# Patient Record
Sex: MALE | Race: WHITE | NOT HISPANIC OR LATINO | Employment: OTHER | URBAN - METROPOLITAN AREA
[De-identification: names, ages, dates, MRNs, and addresses within clinical notes are randomized per-mention and may not be internally consistent; named-entity substitution may affect disease eponyms.]

---

## 2019-01-15 ENCOUNTER — PREP FOR SURGERY (OUTPATIENT)
Dept: OTHER | Facility: HOSPITAL | Age: 54
End: 2019-01-15

## 2019-01-15 DIAGNOSIS — Z12.11 SCREEN FOR COLON CANCER: Primary | ICD-10-CM

## 2019-01-23 PROBLEM — Z12.11 SCREEN FOR COLON CANCER: Status: ACTIVE | Noted: 2019-01-23

## 2019-02-28 ENCOUNTER — HOSPITAL ENCOUNTER (OUTPATIENT)
Facility: HOSPITAL | Age: 54
Setting detail: HOSPITAL OUTPATIENT SURGERY
Discharge: HOME | End: 2019-02-28
Attending: SURGERY | Admitting: SURGERY

## 2019-02-28 ENCOUNTER — ANESTHESIA (OUTPATIENT)
Dept: GASTROENTEROLOGY | Facility: HOSPITAL | Age: 54
End: 2019-02-28

## 2019-02-28 ENCOUNTER — ANESTHESIA EVENT (OUTPATIENT)
Dept: GASTROENTEROLOGY | Facility: HOSPITAL | Age: 54
End: 2019-02-28

## 2019-02-28 VITALS
TEMPERATURE: 97.7 F | BODY MASS INDEX: 28.33 KG/M2 | WEIGHT: 197.9 LBS | HEART RATE: 77 BPM | DIASTOLIC BLOOD PRESSURE: 95 MMHG | SYSTOLIC BLOOD PRESSURE: 139 MMHG | OXYGEN SATURATION: 96 % | HEIGHT: 70 IN | RESPIRATION RATE: 16 BRPM

## 2019-02-28 DIAGNOSIS — Z12.11 SCREEN FOR COLON CANCER: ICD-10-CM

## 2019-02-28 PROCEDURE — 88305 TISSUE EXAM BY PATHOLOGIST: CPT | Performed by: SURGERY

## 2019-02-28 PROCEDURE — 45380 COLONOSCOPY AND BIOPSY: CPT | Performed by: SURGERY

## 2019-02-28 PROCEDURE — 25010000002 PROPOFOL 10 MG/ML EMULSION: Performed by: ANESTHESIOLOGY

## 2019-02-28 PROCEDURE — 45385 COLONOSCOPY W/LESION REMOVAL: CPT | Performed by: SURGERY

## 2019-02-28 RX ORDER — SODIUM CHLORIDE, SODIUM LACTATE, POTASSIUM CHLORIDE, CALCIUM CHLORIDE 600; 310; 30; 20 MG/100ML; MG/100ML; MG/100ML; MG/100ML
1000 INJECTION, SOLUTION INTRAVENOUS CONTINUOUS
Status: DISCONTINUED | OUTPATIENT
Start: 2019-02-28 | End: 2019-02-28 | Stop reason: HOSPADM

## 2019-02-28 RX ORDER — LIDOCAINE HYDROCHLORIDE 20 MG/ML
INJECTION, SOLUTION INFILTRATION; PERINEURAL AS NEEDED
Status: DISCONTINUED | OUTPATIENT
Start: 2019-02-28 | End: 2019-02-28 | Stop reason: SURG

## 2019-02-28 RX ORDER — PROPOFOL 10 MG/ML
VIAL (ML) INTRAVENOUS AS NEEDED
Status: DISCONTINUED | OUTPATIENT
Start: 2019-02-28 | End: 2019-02-28 | Stop reason: SURG

## 2019-02-28 RX ADMIN — LIDOCAINE HYDROCHLORIDE 100 MG: 20 INJECTION, SOLUTION INFILTRATION; PERINEURAL at 12:25

## 2019-02-28 RX ADMIN — SODIUM CHLORIDE, POTASSIUM CHLORIDE, SODIUM LACTATE AND CALCIUM CHLORIDE 1000 ML: 600; 310; 30; 20 INJECTION, SOLUTION INTRAVENOUS at 11:33

## 2019-02-28 RX ADMIN — SODIUM CHLORIDE, POTASSIUM CHLORIDE, SODIUM LACTATE AND CALCIUM CHLORIDE: 600; 310; 30; 20 INJECTION, SOLUTION INTRAVENOUS at 12:25

## 2019-02-28 RX ADMIN — PROPOFOL 400 MG: 10 INJECTION, EMULSION INTRAVENOUS at 12:25

## 2019-02-28 NOTE — ANESTHESIA PREPROCEDURE EVALUATION
Anesthesia Evaluation     Patient summary reviewed and Nursing notes reviewed   NPO Solid Status: > 8 hours  NPO Liquid Status: > 4 hours           Airway   Mallampati: II  TM distance: >3 FB  Neck ROM: full  no difficulty expected  Dental - normal exam     Pulmonary - normal exam   Cardiovascular - normal exam        Neuro/Psych  GI/Hepatic/Renal/Endo      Musculoskeletal     Abdominal  - normal exam   Substance History      OB/GYN          Other                        Anesthesia Plan    ASA 1     MAC     Anesthetic plan, all risks, benefits, and alternatives have been provided, discussed and informed consent has been obtained with: patient.

## 2019-02-28 NOTE — ANESTHESIA POSTPROCEDURE EVALUATION
"Patient: Blaine Bishop    Procedure Summary     Date:  02/28/19 Room / Location:   [facility]    Anesthesia Start:  1230 Anesthesia Stop:  1252    Procedure:  COLONOSCOPY to cecum with hot snare polyectomies and biopsies (N/A ) Diagnosis:       Screen for colon cancer      (Screen for colon cancer [Z12.11])    Surgeon:  Marquise Cervantes MD Provider:  Mika Browning MD    Anesthesia Type:  MAC ASA Status:  1          Anesthesia Type: MAC  Last vitals  BP   126/94 (02/28/19 1252)   Temp   37.2 °C (99 °F) (02/28/19 1119)   Pulse   81 (02/28/19 1252)   Resp   16 (02/28/19 1252)     SpO2   94 % (02/28/19 1252)     Post Anesthesia Care and Evaluation    Patient location during evaluation: bedside  Patient participation: complete - patient participated  Level of consciousness: awake and alert  Pain management: adequate  Airway patency: patent  Anesthetic complications: No anesthetic complications    Cardiovascular status: acceptable  Respiratory status: acceptable  Hydration status: acceptable    Comments: /94 (BP Location: Left arm, Patient Position: Lying)   Pulse 81   Temp 37.2 °C (99 °F) (Oral)   Resp 16   Ht 177.8 cm (70\")   Wt 89.8 kg (197 lb 14.4 oz)   SpO2 94%   BMI 28.40 kg/m²       "

## 2019-03-01 LAB
CYTO UR: NORMAL
LAB AP CASE REPORT: NORMAL
PATH REPORT.FINAL DX SPEC: NORMAL
PATH REPORT.GROSS SPEC: NORMAL

## 2019-03-02 ENCOUNTER — DOCUMENTATION (OUTPATIENT)
Dept: SURGERY | Facility: CLINIC | Age: 54
End: 2019-03-02

## 2019-03-02 NOTE — PROGRESS NOTES
ENDOSCOPY FOLLOW UP NOTE    Colonoscopy 2/28/2019    Indication:  Screening    Findings:  3 small transverse colon polyps  2 small rectal polyps  Mucosal irregularity in the mid sigmoid at approximately 25 cm  Diverticulosis    Pathology:  -Transverse colon polyps: Tubular adenomas  -Rectal polyps: Tubular adenomas  -Mucosal irregularity in the mid sigmoid colon irritated hyperplastic polyp    Recommendations:  Based on the number and type of polyps, 3-year surveillance recommended

## 2019-03-04 ENCOUNTER — TELEPHONE (OUTPATIENT)
Dept: SURGERY | Facility: CLINIC | Age: 54
End: 2019-03-04

## 2019-03-04 NOTE — TELEPHONE ENCOUNTER
----- Message from Marquise Cervantes MD sent at 3/2/2019 10:59 AM EST -----  Please let him know that he had 5 benign polyps and based on the number and type of polyps, 5-year surveillance recommended-put in computer for reminder

## 2022-05-26 ENCOUNTER — OFFICE VISIT (OUTPATIENT)
Dept: SURGERY | Facility: CLINIC | Age: 57
End: 2022-05-26

## 2022-05-26 VITALS — BODY MASS INDEX: 27.92 KG/M2 | WEIGHT: 195 LBS | HEIGHT: 70 IN

## 2022-05-26 DIAGNOSIS — R19.8 CHANGE IN BOWEL FUNCTION: ICD-10-CM

## 2022-05-26 DIAGNOSIS — Z86.010 HISTORY OF COLON POLYPS: ICD-10-CM

## 2022-05-26 DIAGNOSIS — K62.5 RECTAL BLEED: Primary | ICD-10-CM

## 2022-05-26 PROBLEM — Z86.0100 HISTORY OF COLON POLYPS: Status: ACTIVE | Noted: 2022-05-26

## 2022-05-26 PROCEDURE — 99203 OFFICE O/P NEW LOW 30 MIN: CPT | Performed by: PHYSICIAN ASSISTANT

## 2022-05-26 NOTE — PROGRESS NOTES
"CC:    Rectal bleeding and abdominal pain    HPI:    This is a 56-year-old gentleman presenting to the office today as a self-referral.  He previously saw Dr. Cervantes in February 2019 at his last colonoscopy.  At that time he was found to have colon polyps and a 5-year recommendation was made.  He presents today with approximately 1 year of pain with bowel movements as well as a trace amount of blood with these bowel movements.  He states that the blood is always bright red and either in the toilet bowl or on the toilet paper.  The pain occurs right as he is starting to have a bowel movement but goes away shortly afterwards.  He denies having anything protruding out of his rectum.  He is try to stay more regular taking fiber supplementation.  He does state that more recently however he has had abdominal cramping and diarrhea with some mucus with these bowel movements.  He states he believes he previously had an anal fissure which resolved on its own several years ago.  He denies fever, chills, unexpected weight loss, dark tarry stools or bright red blood mixed in with the bowel movement.    PMH:    Reviewed and reconciled in epic    PAST ABDOMINAL SURGERIES:  Colonoscopy 2019, umbilical hernia repair 2017    PAST RELEVANT  SURGERIES:  None    SH:  Does not smoke, does not consume alcohol    FMH:  No colorectal cancers family history    ALLERGIES:   Latex  Penicillin    MEDICATIONS:  Reviewed and reconciled in Epic.    ROS:    All other systems reviewed and negative other than presenting complaints.    PE:  Vitals: Weight 195 height 70\" BMI 27.9  Constitutional: Well-nourished, well-developed male in no acute distress  HEENT: Normocephalic, atraumatic, sclera anicteric, normal conjunctiva  Pulmonary: Clear to auscultation bilaterally, normal respiratory effort, no wheeze, rales or rhonchi noted  Cardiac: Regular rate and rhythm, murmurs, gallops or rubs noted  Abdomen: Soft, nontender, nondistended  Rectal exam: Mild " irritation posterior rectum with area consistent with small fissure posteriorly, minimal pain to palpation    CLINICAL SUMMARY (A/P):    This is a 56-year-old gentleman presenting with approximately 1 year of rectal bleeding and pain with bowel movements and more recently abdominal cramping and diarrhea with mucousy discharge with bowel movements.  On exam he has an area consistent with an anal fissure.  As such I am prescribing HC/diltiazem/lidocaine compound.  Hopefully this resolves his symptoms.  I encouraged him to avoid constipation and use MiraLAX as needed.  Additionally we will proceed with a colonoscopy due to the changes in his bowel function and his history of colon polyps.  Risks and rationale were discussed with him with risk including but not limited to bleeding, infection, bowel perforation and the need for additional procedures.  Any additional follow-up will be discussed once the results been reviewed.  All questions were answered and he was willing to proceed with all recommendations.      Scotty Rodriguez PA-C

## 2022-06-07 ENCOUNTER — ANESTHESIA EVENT (OUTPATIENT)
Dept: GASTROENTEROLOGY | Facility: HOSPITAL | Age: 57
End: 2022-06-07

## 2022-06-08 ENCOUNTER — HOSPITAL ENCOUNTER (OUTPATIENT)
Facility: HOSPITAL | Age: 57
Setting detail: HOSPITAL OUTPATIENT SURGERY
Discharge: HOME | End: 2022-06-08
Attending: SURGERY | Admitting: SURGERY

## 2022-06-08 ENCOUNTER — ANESTHESIA (OUTPATIENT)
Dept: GASTROENTEROLOGY | Facility: HOSPITAL | Age: 57
End: 2022-06-08

## 2022-06-08 VITALS
BODY MASS INDEX: 29.78 KG/M2 | SYSTOLIC BLOOD PRESSURE: 150 MMHG | DIASTOLIC BLOOD PRESSURE: 103 MMHG | RESPIRATION RATE: 14 BRPM | HEIGHT: 70 IN | OXYGEN SATURATION: 99 % | HEART RATE: 82 BPM | WEIGHT: 208 LBS

## 2022-06-08 DIAGNOSIS — Z86.010 HISTORY OF COLON POLYPS: ICD-10-CM

## 2022-06-08 DIAGNOSIS — K62.5 RECTAL BLEED: ICD-10-CM

## 2022-06-08 DIAGNOSIS — R19.8 CHANGE IN BOWEL FUNCTION: ICD-10-CM

## 2022-06-08 PROCEDURE — 25010000002 PROPOFOL 10 MG/ML EMULSION

## 2022-06-08 PROCEDURE — 88305 TISSUE EXAM BY PATHOLOGIST: CPT | Performed by: SURGERY

## 2022-06-08 PROCEDURE — 45380 COLONOSCOPY AND BIOPSY: CPT | Performed by: SURGERY

## 2022-06-08 RX ORDER — GLYCOPYRROLATE 0.2 MG/ML
INJECTION INTRAMUSCULAR; INTRAVENOUS AS NEEDED
Status: DISCONTINUED | OUTPATIENT
Start: 2022-06-08 | End: 2022-06-08 | Stop reason: SURG

## 2022-06-08 RX ORDER — SODIUM CHLORIDE, SODIUM LACTATE, POTASSIUM CHLORIDE, CALCIUM CHLORIDE 600; 310; 30; 20 MG/100ML; MG/100ML; MG/100ML; MG/100ML
30 INJECTION, SOLUTION INTRAVENOUS CONTINUOUS PRN
Status: DISCONTINUED | OUTPATIENT
Start: 2022-06-08 | End: 2022-06-08 | Stop reason: HOSPADM

## 2022-06-08 RX ORDER — PROMETHAZINE HYDROCHLORIDE 25 MG/1
25 TABLET ORAL ONCE AS NEEDED
Status: DISCONTINUED | OUTPATIENT
Start: 2022-06-08 | End: 2022-06-08 | Stop reason: HOSPADM

## 2022-06-08 RX ORDER — PROMETHAZINE HYDROCHLORIDE 25 MG/1
25 SUPPOSITORY RECTAL ONCE AS NEEDED
Status: DISCONTINUED | OUTPATIENT
Start: 2022-06-08 | End: 2022-06-08 | Stop reason: HOSPADM

## 2022-06-08 RX ORDER — PROPOFOL 10 MG/ML
VIAL (ML) INTRAVENOUS AS NEEDED
Status: DISCONTINUED | OUTPATIENT
Start: 2022-06-08 | End: 2022-06-08 | Stop reason: SURG

## 2022-06-08 RX ORDER — LIDOCAINE HYDROCHLORIDE 20 MG/ML
INJECTION, SOLUTION INFILTRATION; PERINEURAL AS NEEDED
Status: DISCONTINUED | OUTPATIENT
Start: 2022-06-08 | End: 2022-06-08 | Stop reason: SURG

## 2022-06-08 RX ADMIN — SODIUM CHLORIDE, POTASSIUM CHLORIDE, SODIUM LACTATE AND CALCIUM CHLORIDE 30 ML/HR: 600; 310; 30; 20 INJECTION, SOLUTION INTRAVENOUS at 12:20

## 2022-06-08 RX ADMIN — GLYCOPYRROLATE 0.2 MG: 0.2 INJECTION INTRAMUSCULAR; INTRAVENOUS at 13:15

## 2022-06-08 RX ADMIN — LIDOCAINE HYDROCHLORIDE 60 MG: 20 INJECTION, SOLUTION INFILTRATION; PERINEURAL at 13:15

## 2022-06-08 RX ADMIN — PROPOFOL 200 MCG/KG/MIN: 10 INJECTION, EMULSION INTRAVENOUS at 13:15

## 2022-06-08 RX ADMIN — PROPOFOL 100 MG: 10 INJECTION, EMULSION INTRAVENOUS at 13:15

## 2022-06-08 NOTE — DISCHARGE INSTRUCTIONS
For the next 24 hours patient needs to be with a responsible adult.    For 24 hours DO NOT drive, operate machinery, appliances, drink alcohol, make important decisions or sign legal documents.    Start with a light or bland diet if you are feeling sick to your stomach otherwise advance to regular diet as tolerated.    Follow recommendations on procedure report if provided by your doctor.    Call Dr DENNIS for problems [phone]    Problems may include but not limited to: large amounts of bleeding, trouble breathing, repeated vomiting, severe unrelieved pain, fever or chills.

## 2022-06-08 NOTE — ANESTHESIA PREPROCEDURE EVALUATION
Anesthesia Evaluation     no history of anesthetic complications:               Airway   Mallampati: I  TM distance: >3 FB  Neck ROM: full  Dental - normal exam     Pulmonary    (+) sleep apnea (he thinks he might have),   (-) shortness of breath, recent URI  Cardiovascular     (-) hypertension, dysrhythmias, angina, hyperlipidemia      Neuro/Psych  (-) dizziness/light headedness, syncope  GI/Hepatic/Renal/Endo    (+)  GI bleeding , liver disease fatty liver disease,     Musculoskeletal     Abdominal    Substance History      OB/GYN          Other                        Anesthesia Plan    ASA 2     MAC     intravenous induction     Anesthetic plan, risks, benefits, and alternatives have been provided, discussed and informed consent has been obtained with: patient.        CODE STATUS:

## 2022-06-08 NOTE — ANESTHESIA POSTPROCEDURE EVALUATION
"Patient: Blaine Bishop    Procedure Summary     Date: 06/08/22 Room / Location:  [facility]    Anesthesia Start: 1313 Anesthesia Stop: 1337    Procedure: COLONOSCOPY TO CECUM WITH COLD BX POLYPECTOMY (N/A ) Diagnosis:       Change in bowel function      History of colon polyps      Rectal bleed      (Change in bowel function [R19.8])      (History of colon polyps [Z86.010])      (Rectal bleed [K62.5])    Surgeons: Marquise Cervantes MD Provider: Baltazar Higuera MD    Anesthesia Type: MAC ASA Status: 2          Anesthesia Type: MAC    Vitals  Vitals Value Taken Time   /103 06/08/22 1356   Temp     Pulse 82 06/08/22 1356   Resp 14 06/08/22 1356   SpO2 94 % 06/08/22 1356           Post Anesthesia Care and Evaluation    Patient location during evaluation: bedside  Patient participation: complete - patient participated  Level of consciousness: awake and alert  Pain management: adequate  Anesthetic complications: No anesthetic complications    Cardiovascular status: acceptable  Respiratory status: acceptable  Hydration status: acceptable    Comments: BP (!) 150/103 (BP Location: Right arm, Patient Position: Sitting)   Pulse 82   Resp 14   Ht 177.8 cm (70\")   Wt 94.3 kg (208 lb)   SpO2 94%   BMI 29.84 kg/m²   Encouraged follow-up with primary for hypertension treatment.      "

## 2022-06-09 ENCOUNTER — TELEPHONE (OUTPATIENT)
Dept: SURGERY | Facility: CLINIC | Age: 57
End: 2022-06-09

## 2022-06-09 ENCOUNTER — DOCUMENTATION (OUTPATIENT)
Dept: SURGERY | Facility: CLINIC | Age: 57
End: 2022-06-09

## 2022-06-09 LAB
LAB AP CASE REPORT: NORMAL
PATH REPORT.FINAL DX SPEC: NORMAL
PATH REPORT.GROSS SPEC: NORMAL

## 2022-06-09 NOTE — PROGRESS NOTES
ENDOSCOPY FOLLOW UP NOTE    Colonoscopy 6/8/2022    Indication:  • Personal history of adenomatous polyps    Findings:  • Small transverse colon polyp  • Diverticulosis    Pathology:  • Tubular adenoma    Recommendations:  • 5-year surveillance    Marquise Cervantes M.D.

## 2022-06-09 NOTE — PROGRESS NOTES
Please let him know that he had a single benign polyp and 5-year surveillance recommended-put in computer for reminder

## 2022-06-09 NOTE — TELEPHONE ENCOUNTER
----- Message from Marquise Cervantes MD sent at 6/9/2022  2:27 PM EDT -----  Please let him know that he had a single benign polyp and 5-year surveillance recommended-put in computer for reminder

## 2022-07-20 ENCOUNTER — OFFICE VISIT (OUTPATIENT)
Dept: SLEEP MEDICINE | Facility: HOSPITAL | Age: 57
End: 2022-07-20

## 2022-07-20 VITALS — HEIGHT: 70 IN | BODY MASS INDEX: 29.78 KG/M2 | WEIGHT: 208 LBS | OXYGEN SATURATION: 96 % | HEART RATE: 93 BPM

## 2022-07-20 DIAGNOSIS — R35.1 NOCTURIA: ICD-10-CM

## 2022-07-20 DIAGNOSIS — E66.3 OVERWEIGHT WITH BODY MASS INDEX (BMI) 25.0-29.9: ICD-10-CM

## 2022-07-20 DIAGNOSIS — G47.30 SLEEP APNEA, UNSPECIFIED TYPE: Primary | ICD-10-CM

## 2022-07-20 DIAGNOSIS — R06.83 SNORING: ICD-10-CM

## 2022-07-20 PROCEDURE — G0463 HOSPITAL OUTPT CLINIC VISIT: HCPCS

## 2022-07-20 PROCEDURE — 99204 OFFICE O/P NEW MOD 45 MIN: CPT | Performed by: FAMILY MEDICINE

## 2022-07-20 NOTE — PROGRESS NOTES
"[facility]Consultation       Reason for Consultation: Sleep disturbances      Patient Care Team:  Provider, No Known as PCP - General  Jovita Craig MD as Consulting Physician (Sleep Medicine)      History of present illness:  Thank you for asking me to see your patient.  The patient is a 56 y.o. male with hypertension presents with concern for sleep disorder.  No history of prior sleep study.  Tonsillectomy 1971/9072.  Patient reports possible septal perforation or deviated septum causing whistling sound when initially falling asleep.  Started about 1 years ago.  Patient reports snoring nocturia 1-2 times a night difficulty staying asleep at night.  No family history of sleep apnea.  Overweight BMI 29.8.  On occasion will use melatonin 3 mg nightly to help fall asleep at night.    Bedtime 11 PM to 12 AM sleep latency 10 to 15 minutes wake time 6:30 AM to 7 AM sleep 6.5 to 7.5 hours 0 naps no rotating shifts.      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  Neck Circumference: 15.5 inches      REVIEW OF SYSTEMS.  Full review of systems available on the intake form which is scanned in the media tab.  The relevant positive are noted below  1. Daytime excessive sleepiness with [place] Sleepiness Scale :Total score: 2   2. Snoring  3. Frequent urination      Physical exam:  Vitals:    07/20/22 1536   Pulse: 93   SpO2: 96%   Weight: 94.3 kg (208 lb)   Height: 177.8 cm (70\")    Body mass index is 29.84 kg/m². Neck Circumference: 15.5 inches  HEENT: Head is atraumatic, " normocephalic  Eyes: pupils are round equal and reacting to light and accommodation, conjunctiva normal  NECK:Neck Circumference: 15.5 inches  RESPIRATORY SYSTEM: Regular respirations  CARDIOVASULAR SYSTEM: Regular rate  EXTREMITES: No cyanosis, clubbing  NEUROLOGICAL SYSTEM: Oriented x 3, no gross motor defects, gait normal      Impression:  1. Sleep apnea, unspecified type    2. Snoring    3. Overweight with body mass index (BMI) 25.0-29.9    4. Nocturia        Plan:    Good sleep hygiene measures should be maintained.  Weight loss would be beneficial in this patient who is overweight BMI 29.8.    I discussed the pathophysiology of obstructive sleep apnea with the patient.  We discussed the adverse outcomes associated with untreated sleep-disordered breathing.  We discussed treatment modalities of obstructive sleep apnea including CPAP device as well as oral mandibular advancement device. Sleep study will be scheduled to establish definitive diagnosis of sleep disorder breathing.  Weight loss will be strongly beneficial in order to reduce the severity of sleep-disordered breathing. Caution during activities that require prolonged concentration is strongly advised.  Patient will be notified of sleep study results after sleep study is completed.  If sleep apnea is only mild,  oral mandibular advancement device may be one of the treatment options.  However if sleep apnea is moderately severe, CPAP treatment will be strongly encouraged.  The patient is not opposed to treatment with CPAP device if we confirm significant obstructive sleep apnea on polysomnography.     If mild GATO prefers [facility].    Thank you for allowing me to participate in your patient's care.    Jovita Craig MD  Sleep Medicine  07/20/22  15:54 EDT

## 2022-08-31 ENCOUNTER — HOSPITAL ENCOUNTER (OUTPATIENT)
Dept: SLEEP MEDICINE | Facility: HOSPITAL | Age: 57
Discharge: HOME | End: 2022-08-31
Admitting: FAMILY MEDICINE

## 2022-08-31 DIAGNOSIS — R35.1 NOCTURIA: ICD-10-CM

## 2022-08-31 DIAGNOSIS — E66.3 OVERWEIGHT WITH BODY MASS INDEX (BMI) 25.0-29.9: ICD-10-CM

## 2022-08-31 DIAGNOSIS — G47.30 SLEEP APNEA, UNSPECIFIED TYPE: ICD-10-CM

## 2022-08-31 DIAGNOSIS — R06.83 SNORING: ICD-10-CM

## 2022-08-31 PROCEDURE — 95806 SLEEP STUDY UNATT&RESP EFFT: CPT

## 2022-08-31 PROCEDURE — 95806 SLEEP STUDY UNATT&RESP EFFT: CPT | Performed by: FAMILY MEDICINE

## 2022-09-21 ENCOUNTER — TELEPHONE (OUTPATIENT)
Dept: SLEEP MEDICINE | Facility: HOSPITAL | Age: 57
End: 2022-09-21

## 2022-10-05 DIAGNOSIS — G47.33 OBSTRUCTIVE SLEEP APNEA: Primary | ICD-10-CM

## 2022-10-25 ENCOUNTER — TELEPHONE (OUTPATIENT)
Dept: SLEEP MEDICINE | Facility: HOSPITAL | Age: 57
End: 2022-10-25

## 2022-10-25 NOTE — TELEPHONE ENCOUNTER
Spoke with pt. Pt will cb and let us know which DME company he decides to use for his cpap equipment.

## 2023-02-02 ENCOUNTER — HOSPITAL ENCOUNTER (EMERGENCY)
Facility: HOSPITAL | Age: 58
Discharge: HOME | End: 2023-02-02
Attending: EMERGENCY MEDICINE | Admitting: EMERGENCY MEDICINE
Payer: COMMERCIAL

## 2023-02-02 VITALS
BODY MASS INDEX: 29.35 KG/M2 | DIASTOLIC BLOOD PRESSURE: 96 MMHG | TEMPERATURE: 98.9 F | WEIGHT: 205 LBS | RESPIRATION RATE: 16 BRPM | HEIGHT: 70 IN | SYSTOLIC BLOOD PRESSURE: 142 MMHG | HEART RATE: 90 BPM | OXYGEN SATURATION: 96 %

## 2023-02-02 DIAGNOSIS — Z87.39 HISTORY OF MUSCLE SPASM: Primary | ICD-10-CM

## 2023-02-02 LAB
ALBUMIN SERPL-MCNC: 4.9 G/DL (ref 3.5–5.2)
ALBUMIN/GLOB SERPL: 2 G/DL
ALP SERPL-CCNC: 77 U/L (ref 39–117)
ALT SERPL W P-5'-P-CCNC: 42 U/L (ref 1–41)
ANION GAP SERPL CALCULATED.3IONS-SCNC: 8.5 MMOL/L (ref 5–15)
AST SERPL-CCNC: 24 U/L (ref 1–40)
BASOPHILS # BLD AUTO: 0.04 10*3/MM3 (ref 0–0.2)
BASOPHILS NFR BLD AUTO: 0.6 % (ref 0–1.5)
BILIRUB SERPL-MCNC: 0.6 MG/DL (ref 0–1.2)
BUN SERPL-MCNC: 15 MG/DL (ref 6–20)
BUN/CREAT SERPL: 16.5 (ref 7–25)
CALCIUM SPEC-SCNC: 9.1 MG/DL (ref 8.6–10.5)
CHLORIDE SERPL-SCNC: 104 MMOL/L (ref 98–107)
CO2 SERPL-SCNC: 27.5 MMOL/L (ref 22–29)
CREAT SERPL-MCNC: 0.91 MG/DL (ref 0.76–1.27)
DEPRECATED RDW RBC AUTO: 38.8 FL (ref 37–54)
EGFRCR SERPLBLD CKD-EPI 2021: 98.9 ML/MIN/1.73
EOSINOPHIL # BLD AUTO: 0.03 10*3/MM3 (ref 0–0.4)
EOSINOPHIL NFR BLD AUTO: 0.5 % (ref 0.3–6.2)
ERYTHROCYTE [DISTWIDTH] IN BLOOD BY AUTOMATED COUNT: 12.4 % (ref 12.3–15.4)
GLOBULIN UR ELPH-MCNC: 2.5 GM/DL
GLUCOSE SERPL-MCNC: 137 MG/DL (ref 65–99)
HCT VFR BLD AUTO: 47.1 % (ref 37.5–51)
HGB BLD-MCNC: 16.6 G/DL (ref 13–17.7)
HOLD SPECIMEN: NORMAL
HOLD SPECIMEN: NORMAL
IMM GRANULOCYTES # BLD AUTO: 0.04 10*3/MM3 (ref 0–0.05)
IMM GRANULOCYTES NFR BLD AUTO: 0.6 % (ref 0–0.5)
LYMPHOCYTES # BLD AUTO: 1.23 10*3/MM3 (ref 0.7–3.1)
LYMPHOCYTES NFR BLD AUTO: 19.7 % (ref 19.6–45.3)
MAGNESIUM SERPL-MCNC: 2.3 MG/DL (ref 1.6–2.6)
MCH RBC QN AUTO: 30.4 PG (ref 26.6–33)
MCHC RBC AUTO-ENTMCNC: 35.2 G/DL (ref 31.5–35.7)
MCV RBC AUTO: 86.3 FL (ref 79–97)
MONOCYTES # BLD AUTO: 0.42 10*3/MM3 (ref 0.1–0.9)
MONOCYTES NFR BLD AUTO: 6.7 % (ref 5–12)
NEUTROPHILS NFR BLD AUTO: 4.49 10*3/MM3 (ref 1.7–7)
NEUTROPHILS NFR BLD AUTO: 71.9 % (ref 42.7–76)
NRBC BLD AUTO-RTO: 0 /100 WBC (ref 0–0.2)
PLATELET # BLD AUTO: 325 10*3/MM3 (ref 140–450)
PMV BLD AUTO: 9.5 FL (ref 6–12)
POTASSIUM SERPL-SCNC: 3.9 MMOL/L (ref 3.5–5.2)
PROT SERPL-MCNC: 7.4 G/DL (ref 6–8.5)
QT INTERVAL: 374 MS
RBC # BLD AUTO: 5.46 10*6/MM3 (ref 4.14–5.8)
SODIUM SERPL-SCNC: 140 MMOL/L (ref 136–145)
WBC NRBC COR # BLD: 6.25 10*3/MM3 (ref 3.4–10.8)
WHOLE BLOOD HOLD SPECIMEN: NORMAL

## 2023-02-02 PROCEDURE — 93010 ELECTROCARDIOGRAM REPORT: CPT | Performed by: INTERNAL MEDICINE

## 2023-02-02 PROCEDURE — 99283 EMERGENCY DEPT VISIT LOW MDM: CPT

## 2023-02-02 PROCEDURE — 85025 COMPLETE CBC W/AUTO DIFF WBC: CPT | Performed by: PHYSICIAN ASSISTANT

## 2023-02-02 PROCEDURE — 80053 COMPREHEN METABOLIC PANEL: CPT | Performed by: PHYSICIAN ASSISTANT

## 2023-02-02 PROCEDURE — 83735 ASSAY OF MAGNESIUM: CPT | Performed by: PHYSICIAN ASSISTANT

## 2023-02-02 PROCEDURE — 93005 ELECTROCARDIOGRAM TRACING: CPT | Performed by: PHYSICIAN ASSISTANT

## 2023-02-02 RX ORDER — AMLODIPINE BESYLATE 5 MG/1
5 TABLET ORAL DAILY
COMMUNITY
End: 2023-03-23 | Stop reason: ALTCHOICE

## 2023-02-02 NOTE — DISCHARGE INSTRUCTIONS
You are advised to follow closely with Dr Jaramillo in 2-3 days for recheck, final results of lab work  testing, and further testing/treatment as needed.    Heat and gentle stretching    Hydrate well    Taper nicotine    Please return to the emergency department immediately with chest pain different than usual for you, shortness of air, abdominal pain, persistent vomiting/fever, blood in emesis or stool, lightheadedness/fainting, problems with speech, one sided weakness/numbness, new incontinence, problems with vision,  or for worsening of symptoms or other concerns.

## 2023-02-02 NOTE — ED NOTES
"Pt to ED from home c/o nerves \"jumping\" all over his body, pt states he feels his nerves jumping/throbbing everywhere since Saturday, denies pain, denies fever/cough/chills, states he had 2 episodes of diarrhea yesterday, denies n/v, pt denies underlying anxiety issues, pt a/o x 4 at this time    PPE per protocol utilized throughout entire patient encounter.     "

## 2023-02-02 NOTE — ED PROVIDER NOTES
"The ROLANDA and I have discussed this patient's history, physical exam and treatment plan.  I provided a substantive portion of the care of this patient.  I have reviewed the documentation and personally had a face to face interaction with the patient and personally performed the physical exam, in its entirety.  I affirm the documentation and agree with the treatment and plan.  The following describes my personal findings.      The patient presents complaining of \"nerves jumping all over his body\" for 5 days.  Worse in the legs but diffuse.  Momentary symptoms that are self resolved.  No new medicines except for a course of antibiotics he finished a month ago.  Patient reports he uses 15-25 3 to 6 mg nicotine pouches a day and is wondering whether this might be causing his symptoms.  Patient denies unilateral weakness or numbness, incontinence, new visual or speech disturbance, falls within the past week.    Comprehensive Physical exam:  Patient is nontoxic appearing oriented, awake, alert  HEENT: normocephalic, atraumatic  Neck: No JVD no goiter, no pain with ROM  Pulmonary: Nontachypneic, breath sounds are well bilaterally  cardiovascular: Nontachycardic  Abdomen: Soft nontender  musculoskeletal: Good range of motion, pulse, sensation x4  Neuro/psychiatric:calm, appropriate, cooperative, reflexes 2+ equal bilaterally Achilles and patellar, sensation intact lower extremities  Skin:warm, dry    EKG done for arrhythmia/interval analysis         EKG time: 1121  Rhythm/Rate: Sinus rhythm, rate 80s  P waves and VA: Normal P waves, normal MICHEL's  QRS, axis: Q's in inferior leads, right bundle branch pattern  ST and T waves: Nonspecific ST/T wave findings inferior leads    Interpreted Contemporaneously by me, independently viewed  No old for comparison    Anticipate outpatient follow-up with Dr. Jaramlilo or PCP of patient's choice for recheck, further testing, treatment as needed, taper nicotine and gentle stretching    Patient " was wearing facemask when I entered the room and throughout our encounter. Full protective equipment was worn throughout this patient encounter including a face mask, eye protection and gloves. Hand hygiene was performed before donning protective equipment and after removal when leaving the room.           Leslie Garcia MD  02/02/23 [id number]

## 2023-02-02 NOTE — ED PROVIDER NOTES
" EMERGENCY DEPARTMENT ENCOUNTER    Room Number:  24/24  Date seen:  2/2/2023  PCP: Provider, No Known  Discussed/ obtained information from independent historians: patient      HPI:  Chief Complaint: \"it feels like nerves jumping all over my body\"  A complete HPI/ROS/PMH/PSH/SH/FH are unobtainable due to: none  Context: Blaine Bishop is a 57 y.o. male with a history of hypertension who presents to the ED c/o of a sensation of nerves jumping all over his body for the last 5 days.  More objectively he describes muscle twitching.  Particularly the thighs and the calves but diffusely all over the body including to the upper extremities and trunk.  He states that it lasts a split second and then goes away.  Its been persistent and he started to worry that it might be a sign of something more serious and therefore presents for further evaluation.  He denies any chest pain shortness of breath abdominal pain nausea or vomiting.  He states he had 1 or 2 more loose stools yesterday, nothing since.  Denies any chest pain palpitations shortness of breath edema swelling or upper respiratory symptoms.  He completed a course of Bactrim for prostatitis approximately 1 month ago, no other new medications.      External (non-ED) record review: Patient underwent colonoscopy and polypectomy with Dr. Cervantes on 6/8/2022.      PAST MEDICAL HISTORY  Active Ambulatory Problems     Diagnosis Date Noted   • Screen for colon cancer 01/23/2019   • Change in bowel function 05/26/2022   • History of colon polyps 05/26/2022   • Rectal bleed 05/26/2022     Resolved Ambulatory Problems     Diagnosis Date Noted   • No Resolved Ambulatory Problems     Past Medical History:   Diagnosis Date   • Anal fissure    • Colon polyp 2015   • Eczema    • Fatty liver    • Fissure, anal 2012   • Has never received COVID-19 vaccine    • Rectal bleeding 2021   • Tinnitus          PAST SURGICAL HISTORY  Past Surgical History:   Procedure Laterality Date   • " COLONOSCOPY N/A 02/28/2019    Procedure: COLONOSCOPY to cecum with hot snare polyectomies and biopsies;  Surgeon: Marquise Cervantes MD;  Location:  [facility];  Service: General   • COLONOSCOPY N/A 6/8/2022    Procedure: COLONOSCOPY TO CECUM WITH COLD BX POLYPECTOMY;  Surgeon: Marquise Cervantes MD;  Location:  [facility];  Service: General;  Laterality: N/A;  RECTAL BLDG, ABD PAIN  --DIVERTICULOSIS, POLYP    • UMBILICAL HERNIA REPAIR      JULY 2017         FAMILY HISTORY  Family History   Problem Relation Age of Onset   • COPD Mother         Mother had COPD   • Heart disease Father         Father had heart disease   • Malig Hyperthermia Neg Hx          SOCIAL HISTORY  Social History     Socioeconomic History   • Marital status:    Tobacco Use   • Smoking status: Never   • Smokeless tobacco: Former     Types: Snuff   • Tobacco comments:     Formerly used smokeless tobacco   Vaping Use   • Vaping Use: Never used   Substance and Sexual Activity   • Alcohol use: Not Currently     Comment: RARE   • Drug use: Never   • Sexual activity: Yes     Partners: Female         ALLERGIES  Other and Penicillins        REVIEW OF SYSTEMS  Review of Systems         PHYSICAL EXAM  ED Triage Vitals [02/02/23 1013]   Temp Heart Rate Resp BP SpO2   98.9 °F (37.2 °C) 97 16 -- 94 %      Temp src Heart Rate Source Patient Position BP Location FiO2 (%)   Tympanic Monitor -- -- --       Physical Exam      GENERAL: no acute distress  HENT: normocephalic, atraumatic  EYES: no scleral icterus  CV: regular rhythm, normal rate  RESPIRATORY: normal effort CTA B  ABDOMEN: nondistended, soft, nontender, normal bowel sounds, no guarding or rigidity  MUSCULOSKELETAL: no deformity.  No myoclonus  NEURO: alert, moves all extremities, follows commands  PSYCH:  calm, cooperative  SKIN: warm, dry    Vital signs and nursing notes reviewed.          LAB RESULTS  Recent Results (from the past 24 hour(s))   Green Top (Gel)    Collection Time:  02/02/23 10:29 AM   Result Value Ref Range    Extra Tube Hold for add-ons.    Lavender Top    Collection Time: 02/02/23 10:29 AM   Result Value Ref Range    Extra Tube hold for add-on    Gold Top - SST    Collection Time: 02/02/23 10:29 AM   Result Value Ref Range    Extra Tube Hold for add-ons.    Comprehensive Metabolic Panel    Collection Time: 02/02/23 10:29 AM    Specimen: Blood   Result Value Ref Range    Glucose 137 (H) 65 - 99 mg/dL    BUN 15 6 - 20 mg/dL    Creatinine 0.91 0.76 - 1.27 mg/dL    Sodium 140 136 - 145 mmol/L    Potassium 3.9 3.5 - 5.2 mmol/L    Chloride 104 98 - 107 mmol/L    CO2 27.5 22.0 - 29.0 mmol/L    Calcium 9.1 8.6 - 10.5 mg/dL    Total Protein 7.4 6.0 - 8.5 g/dL    Albumin 4.9 3.5 - 5.2 g/dL    ALT (SGPT) 42 (H) 1 - 41 U/L    AST (SGOT) 24 1 - 40 U/L    Alkaline Phosphatase 77 39 - 117 U/L    Total Bilirubin 0.6 0.0 - 1.2 mg/dL    Globulin 2.5 gm/dL    A/G Ratio 2.0 g/dL    BUN/Creatinine Ratio 16.5 7.0 - 25.0    Anion Gap 8.5 5.0 - 15.0 mmol/L    eGFR 98.9 >60.0 mL/min/1.73   Magnesium    Collection Time: 02/02/23 10:29 AM    Specimen: Blood   Result Value Ref Range    Magnesium 2.3 1.6 - 2.6 mg/dL   CBC Auto Differential    Collection Time: 02/02/23 10:29 AM    Specimen: Blood   Result Value Ref Range    WBC 6.25 3.40 - 10.80 10*3/mm3    RBC 5.46 4.14 - 5.80 10*6/mm3    Hemoglobin 16.6 13.0 - 17.7 g/dL    Hematocrit 47.1 37.5 - 51.0 %    MCV 86.3 79.0 - 97.0 fL    MCH 30.4 26.6 - 33.0 pg    MCHC 35.2 31.5 - 35.7 g/dL    RDW 12.4 12.3 - 15.4 %    RDW-SD 38.8 37.0 - 54.0 fl    MPV 9.5 6.0 - 12.0 fL    Platelets 325 140 - 450 10*3/mm3    Neutrophil % 71.9 42.7 - 76.0 %    Lymphocyte % 19.7 19.6 - 45.3 %    Monocyte % 6.7 5.0 - 12.0 %    Eosinophil % 0.5 0.3 - 6.2 %    Basophil % 0.6 0.0 - 1.5 %    Immature Grans % 0.6 (H) 0.0 - 0.5 %    Neutrophils, Absolute 4.49 1.70 - 7.00 10*3/mm3    Lymphocytes, Absolute 1.23 0.70 - 3.10 10*3/mm3    Monocytes, Absolute 0.42 0.10 - 0.90 10*3/mm3     Eosinophils, Absolute 0.03 0.00 - 0.40 10*3/mm3    Basophils, Absolute 0.04 0.00 - 0.20 10*3/mm3    Immature Grans, Absolute 0.04 0.00 - 0.05 10*3/mm3    nRBC 0.0 0.0 - 0.2 /100 WBC   ECG 12 Lead Electrolyte Imbalance    Collection Time: 02/02/23 11:21 AM   Result Value Ref Range    QT Interval 374 ms       Ordered the above labs and reviewed the results.          PROCEDURES  Procedures              MEDICATIONS GIVEN IN ER  Medications - No data to display                MEDICAL DECISION MAKING, PROGRESS, and CONSULTS    All labs have been independently reviewed by me.  All radiology studies have been reviewed by me and I have also reviewed the radiology report.   EKG's independently viewed and interpreted by me.  Discussion below represents my analysis of pertinent findings related to patient's condition, differential diagnosis, treatment plan and final disposition.      Additional sources:        Orders placed during this visit:  Orders Placed This Encounter   Procedures   • [place] Draw   • Comprehensive Metabolic Panel   • Magnesium   • CBC Auto Differential   • ECG 12 Lead Electrolyte Imbalance   • Green Top (Gel)   • Lavender Top   • Gold Top - SST   • CBC & Differential           Differential diagnosis:  Electrolyte disturbance, medication side effect, palpitations/arrhythmia      Independent interpretation of labs, radiology studies, and discussions with consultants:  ED Course as of 02/02/23 2021 Thu Feb 02, 2023   1117 WBC: 6.25 [KA]   1118 Hemoglobin: 16.6 [KA]   1118 Glucose(!): 137 [KA]   1118 Creatinine: 0.91 [KA]   1118 Magnesium: 2.3 [KA]   1118 Sodium: 140 [KA]   1118 Potassium: 3.9 [KA]   1118 Calcium: 9.1 [KA]      ED Course User Index  [KA] Geneva Frias PA             Patient was wearing a face mask when I entered the room and they continued to wear a mask throughout their stay in the ED.  I wore PPE, including  gloves, face mask with shield or face mask with goggles whenever I was in the  room with patient.     DIAGNOSIS  Final diagnoses:   History of muscle spasm           Follow Up:  Eduar Jaramillo MD  [address] [id number] [phone]    Schedule an appointment as soon as possible for a visit in 3 days  EVEN IF WELL      RX:     Medication List      No changes were made to your prescriptions during this visit.         Latest Documented Vital Signs:  As of 20:21 EST  BP- 142/96 HR- 90 Temp- 98.9 °F (37.2 °C) (Tympanic) O2 sat- 96%              --    Please note that portions of this were completed with a voice recognition program.       Note Disclaimer: At [facility], we believe that sharing information builds trust and better relationships. You are receiving this note because you are receiving care at [facility] or recently visited. It is possible you will see health information before a provider has talked with you about it. This kind of information can be easy to misunderstand. To help you fully understand what it means for your health, we urge you to discuss this note with your provider.           Geneva Frias PA  02/02/23 2021

## 2023-02-02 NOTE — ED TRIAGE NOTES
"Since Saturday - he has had \"nerves jumping all over my body\"    Patient was placed in face mask during first look triage.  Patient was wearing a face mask throughout encounter.  I wore personal protective equipment throughout the encounter.  Hand hygiene was performed before and after patient encounter.     "

## 2023-02-08 ENCOUNTER — TRANSCRIBE ORDERS (OUTPATIENT)
Dept: ADMINISTRATIVE | Facility: HOSPITAL | Age: 58
End: 2023-02-08
Payer: COMMERCIAL

## 2023-02-08 DIAGNOSIS — R29.90 UNSPECIFIED SYMPTOMS AND SIGNS INVOLVING THE NERVOUS SYSTEM: ICD-10-CM

## 2023-02-08 DIAGNOSIS — R25.3 MUSCULAR FASCICULATION: Primary | ICD-10-CM

## 2023-02-08 DIAGNOSIS — R53.1 WEAKNESS: ICD-10-CM

## 2023-02-09 ENCOUNTER — HOSPITAL ENCOUNTER (OUTPATIENT)
Dept: MRI IMAGING | Facility: HOSPITAL | Age: 58
Discharge: HOME | End: 2023-02-09
Admitting: INTERNAL MEDICINE
Payer: COMMERCIAL

## 2023-02-09 ENCOUNTER — OFFICE VISIT (OUTPATIENT)
Dept: CARDIOLOGY | Facility: CLINIC | Age: 58
End: 2023-02-09
Payer: COMMERCIAL

## 2023-02-09 VITALS
BODY MASS INDEX: 29.35 KG/M2 | SYSTOLIC BLOOD PRESSURE: 131 MMHG | WEIGHT: 205 LBS | DIASTOLIC BLOOD PRESSURE: 81 MMHG | HEART RATE: 72 BPM | HEIGHT: 70 IN

## 2023-02-09 DIAGNOSIS — E78.00 PURE HYPERCHOLESTEROLEMIA: ICD-10-CM

## 2023-02-09 DIAGNOSIS — I10 PRIMARY HYPERTENSION: ICD-10-CM

## 2023-02-09 DIAGNOSIS — F17.200 NICOTINE DEPENDENCE, UNCOMPLICATED, UNSPECIFIED NICOTINE PRODUCT TYPE: ICD-10-CM

## 2023-02-09 DIAGNOSIS — R94.31 ABNORMAL EKG: Primary | ICD-10-CM

## 2023-02-09 DIAGNOSIS — R73.03 BORDERLINE DIABETES: ICD-10-CM

## 2023-02-09 DIAGNOSIS — R29.90 UNSPECIFIED SYMPTOMS AND SIGNS INVOLVING THE NERVOUS SYSTEM: ICD-10-CM

## 2023-02-09 DIAGNOSIS — R53.1 WEAKNESS: ICD-10-CM

## 2023-02-09 DIAGNOSIS — R25.3 MUSCULAR FASCICULATION: ICD-10-CM

## 2023-02-09 DIAGNOSIS — Z82.49 FH ISCHEMIC HEART DISEASE: ICD-10-CM

## 2023-02-09 PROCEDURE — 99203 OFFICE O/P NEW LOW 30 MIN: CPT | Performed by: INTERNAL MEDICINE

## 2023-02-09 PROCEDURE — 93000 ELECTROCARDIOGRAM COMPLETE: CPT | Performed by: INTERNAL MEDICINE

## 2023-02-09 PROCEDURE — 70551 MRI BRAIN STEM W/O DYE: CPT

## 2023-02-09 NOTE — PROGRESS NOTES
NP REF BY DR PALMER AB EKG PER ABHIJIT   Subjective:        [facility]  Cardiology Consult Note    Patient Identification:  Name: Sanjay Bishop  Age: 57 y.o.  Sex: male  :  1965  MRN: [id number]             CC  Nerve twiching in arm, and all over body for 2 wks    nicotin abuse  f/h      History of Present Illness:   57 years old male here for further cardiac evaluation as well as establishment of the care as the patient complaining of the nerve twitching in arms and all over the body for the 2 weeks    Patient has a strong family history for coronary artery disease    Comorbid cardiac risk factors:     Past Medical History:  Past Medical History:   Diagnosis Date   • Anal fissure    • Colon polyp     During colonoscopy   • Eczema    • Fatty liver    • Fissure, anal     Diagnosed with anal fissure   • Has never received COVID-19 vaccine    • Rectal bleeding    • Tinnitus      Past Surgical History:  Past Surgical History:   Procedure Laterality Date   • COLONOSCOPY N/A 2019    Procedure: COLONOSCOPY to cecum with hot snare polyectomies and biopsies;  Surgeon: Marquise Cervantes MD;  Location[facility];  Service: General   • COLONOSCOPY N/A 2022    Procedure: COLONOSCOPY TO CECUM WITH COLD BX POLYPECTOMY;  Surgeon: Marquise Cervantes MD;  Location: [facility];  Service: General;  Laterality: N/A;  RECTAL BLDG, ABD PAIN  --DIVERTICULOSIS, POLYP    • UMBILICAL HERNIA REPAIR      2017      Allergies:  Allergies   Allergen Reactions   • Other Rash     BAND-AID CAUSE A RASH   • Penicillins Rash     Home Meds:  (Not in a hospital admission)    Current Meds:     Current Outpatient Medications:   •  amLODIPine (NORVASC) 5 MG tablet, Take 5 mg by mouth Daily., Disp: , Rfl:   •  Sildenafil Citrate (VIAGRA PO), Take 40 mg by mouth Daily As Needed., Disp: , Rfl:   Social History:   Social History     Tobacco Use   • Smoking status: Never   • Smokeless tobacco: Former      Types: Snuff   • Tobacco comments:     Formerly used smokeless tobacco   Substance Use Topics   • Alcohol use: Not Currently     Comment: RARE      Family History:  Family History   Problem Relation Age of Onset   • COPD Mother         Mother had COPD   • Heart disease Father         Father had heart disease   • Malig Hyperthermia Neg Hx         Review of Systems    Constitutional: No weakness,fatigue, fever, rigors, chills   Eyes: No vision changes, eye pain   ENT/oropharynx: No difficulty swallowing, sore throat, epistaxis, changes in hearing   Cardiovascular: No chest pain, chest tightness, palpitations, paroxysmal nocturnal dyspnea, orthopnea, diaphoresis, dizziness / syncopal episode   Respiratory: No shortness of breath, dyspnea on exertion, cough, wheezing hemoptysis   Gastrointestinal: No abdominal pain, nausea, vomiting, diarrhea, bloody stools   Genitourinary: No hematuria, dysuria   Neurological: No headache, tremors, numbness,  one-sided weakness    Musculoskeletal: No cramps, myalgias,  joint pain, joint swelling   Integument: No rash, edema           Constitutional:       Physical Exam   General:  Appears in no acute distress  Eyes: PERTL,  HEENT:  No JVD. Thyroid not visibly enlarged. No mucosal pallor or cyanosis  Respiratory: Respirations regular and unlabored at rest. BBS with good air entry in all fields. No crackles, rubs or wheezes auscultated  Cardiovascular: S1S2 Regular rate and rhythm. No murmur, rub or gallop auscultated. No carotid bruits. DP/PT pulses    . No pretibial pitting edema  Gastrointestinal: Abdomen soft, flat, non tender. Bowel sounds present. No hepatosplenomegaly. No ascites  Musculoskeletal: GALLEGOS x4. No abnormal movements  Extremities: No digital clubbing or cyanosis  Skin: Color pink. Skin warm and dry to touch. No rashes  No xanthoma  Neuro: AAO x3 CN II-XII grossly intact            ECG 12 Lead    Date/Time: 2/9/2023 11:47 AM  Performed by: Laci Parr  MD  Authorized by: Laci Parr MD   Comparison: not compared with previous ECG   Previous ECG: no previous ECG available  Rhythm: sinus rhythm  ST Flattening: all    Clinical impression: non-specific ECG                Cardiographics  ECG:     Telemetry:    Echocardiogram:     Imaging  Chest X-ray:     Lab Review               @[email or web address]@              Assessment:/ Recommendations / Plan:   Patient Active Problem List   Diagnosis   • Screen for colon cancer   • Change in bowel function   • History of colon polyps   • Rectal bleed   • Abnormal EKG   • Nicotine dependence, uncomplicated   • Primary hypertension   • FH ischemic heart disease   • Borderline diabetes   • Pure hypercholesterolemia                    ICD-10-CM ICD-9-CM   1. Abnormal EKG  R94.31 794.31   2. Nicotine dependence, uncomplicated, unspecified nicotine product type  F17.200 305.1   3. Primary hypertension  I10 401.9   4. FH ischemic heart disease  Z82.49 V17.3   5. Borderline diabetes  R73.03 790.29   6. Pure hypercholesterolemia  E78.00 272.0     1. Abnormal EKG  Considering the patient's symptoms as well as clinical situation and  EKG findings, along with cardiac risk factors, ischemic workup is necessary to rule out ischemic cardiomyopathy, stress induced arrhythmias, and functional capacity for diagnosis as well as prognostic consideration    - Adult Transthoracic Echo Complete W/ Cont if Necessary Per Protocol  - Treadmill Stress Test  - Lipid Panel    2. Nicotine dependence, uncomplicated, unspecified nicotine product type  Counseling done  - Adult Transthoracic Echo Complete W/ Cont if Necessary Per Protocol  - Treadmill Stress Test  - Lipid Panel    3. Primary hypertension  Continue current treatment  - Adult Transthoracic Echo Complete W/ Cont if Necessary Per Protocol  - Treadmill Stress Test  - Lipid Panel    4. FH ischemic heart disease  Considering patient's medical condition as well as the risk factors, patient will  require echocardiogram for further evaluation for the LV function, four-chamber evaluation, including the pressures, valvular function and  pericardial disease and pericardial effusion    - Adult Transthoracic Echo Complete W/ Cont if Necessary Per Protocol  - Treadmill Stress Test  - Lipid Panel    5. Borderline diabetes  Referred to primary care  - Adult Transthoracic Echo Complete W/ Cont if Necessary Per Protocol  - Treadmill Stress Test  - Lipid Panel    6. Pure hypercholesterolemia  Continue current treatment  - Adult Transthoracic Echo Complete W/ Cont if Necessary Per Protocol  - Treadmill Stress Test  - Lipid Panel    Flp, ett, echo    Will denise chol meds  Labs/tests ordered for am      Laci Parr MD  2/10/2023, 11:37 EST      EMR Dragon/Transcription:   Dictated utilizing Dragon dictation

## 2023-02-21 ENCOUNTER — HOSPITAL ENCOUNTER (OUTPATIENT)
Dept: CARDIOLOGY | Facility: HOSPITAL | Age: 58
Discharge: HOME | End: 2023-02-21
Admitting: INTERNAL MEDICINE
Payer: COMMERCIAL

## 2023-02-21 VITALS
SYSTOLIC BLOOD PRESSURE: 139 MMHG | BODY MASS INDEX: 29.35 KG/M2 | HEART RATE: 76 BPM | HEIGHT: 70 IN | WEIGHT: 205 LBS | DIASTOLIC BLOOD PRESSURE: 86 MMHG

## 2023-02-21 LAB
AORTIC DIMENSIONLESS INDEX: 0.6 (DI)
BH CV ECHO MEAS - ACS: 2.26 CM
BH CV ECHO MEAS - AO MAX PG: 9.7 MMHG
BH CV ECHO MEAS - AO MEAN PG: 5.7 MMHG
BH CV ECHO MEAS - AO ROOT DIAM: 3 CM
BH CV ECHO MEAS - AO V2 MAX: 156.1 CM/SEC
BH CV ECHO MEAS - AO V2 VTI: 33.6 CM
BH CV ECHO MEAS - AVA(I,D): 1.84 CM2
BH CV ECHO MEAS - EDV(CUBED): 69.5 ML
BH CV ECHO MEAS - EDV(MOD-SP2): 55 ML
BH CV ECHO MEAS - EDV(MOD-SP4): 58 ML
BH CV ECHO MEAS - EF(MOD-BP): 62 %
BH CV ECHO MEAS - EF(MOD-SP2): 61.8 %
BH CV ECHO MEAS - EF(MOD-SP4): 62.1 %
BH CV ECHO MEAS - ESV(CUBED): 19.1 ML
BH CV ECHO MEAS - ESV(MOD-SP2): 21 ML
BH CV ECHO MEAS - ESV(MOD-SP4): 22 ML
BH CV ECHO MEAS - FS: 34.9 %
BH CV ECHO MEAS - IVS/LVPW: 1.07 CM
BH CV ECHO MEAS - IVSD: 1.23 CM
BH CV ECHO MEAS - LA DIMENSION: 3.4 CM
BH CV ECHO MEAS - LAT PEAK E' VEL: 10.2 CM/SEC
BH CV ECHO MEAS - LV DIASTOLIC VOL/BSA (35-75): 27.5 CM2
BH CV ECHO MEAS - LV MASS(C)D: 170.1 GRAMS
BH CV ECHO MEAS - LV MAX PG: 4 MMHG
BH CV ECHO MEAS - LV MEAN PG: 1.7 MMHG
BH CV ECHO MEAS - LV SYSTOLIC VOL/BSA (12-30): 10.4 CM2
BH CV ECHO MEAS - LV V1 MAX: 99.4 CM/SEC
BH CV ECHO MEAS - LV V1 VTI: 18.9 CM
BH CV ECHO MEAS - LVIDD: 4.1 CM
BH CV ECHO MEAS - LVIDS: 2.7 CM
BH CV ECHO MEAS - LVOT AREA: 3.3 CM2
BH CV ECHO MEAS - LVOT DIAM: 2.04 CM
BH CV ECHO MEAS - LVPWD: 1.15 CM
BH CV ECHO MEAS - MED PEAK E' VEL: 8.5 CM/SEC
BH CV ECHO MEAS - MR MAX PG: 154.7 MMHG
BH CV ECHO MEAS - MR MAX VEL: 621.9 CM/SEC
BH CV ECHO MEAS - MV A DUR: 0.16 SEC
BH CV ECHO MEAS - MV A MAX VEL: 87.8 CM/SEC
BH CV ECHO MEAS - MV DEC SLOPE: 344.3 CM/SEC2
BH CV ECHO MEAS - MV DEC TIME: 195 MSEC
BH CV ECHO MEAS - MV E MAX VEL: 77.6 CM/SEC
BH CV ECHO MEAS - MV E/A: 0.88
BH CV ECHO MEAS - MV MAX PG: 3.6 MMHG
BH CV ECHO MEAS - MV MEAN PG: 1.87 MMHG
BH CV ECHO MEAS - MV P1/2T: 82 MSEC
BH CV ECHO MEAS - MV V2 VTI: 24.3 CM
BH CV ECHO MEAS - MVA(P1/2T): 2.7 CM2
BH CV ECHO MEAS - MVA(VTI): 2.5 CM2
BH CV ECHO MEAS - PA ACC TIME: 0.13 SEC
BH CV ECHO MEAS - PA PR(ACCEL): 19.6 MMHG
BH CV ECHO MEAS - PA V2 MAX: 139.8 CM/SEC
BH CV ECHO MEAS - PULM A REVS DUR: 0.15 SEC
BH CV ECHO MEAS - PULM A REVS VEL: 35.2 CM/SEC
BH CV ECHO MEAS - PULM DIAS VEL: 57.3 CM/SEC
BH CV ECHO MEAS - PULM S/D: 1.13
BH CV ECHO MEAS - PULM SYS VEL: 64.9 CM/SEC
BH CV ECHO MEAS - QP/QS: 1.52
BH CV ECHO MEAS - RV MAX PG: 2.15 MMHG
BH CV ECHO MEAS - RV V1 MAX: 73.2 CM/SEC
BH CV ECHO MEAS - RV V1 VTI: 14.7 CM
BH CV ECHO MEAS - RVDD: 3.2 CM
BH CV ECHO MEAS - RVOT DIAM: 2.9 CM
BH CV ECHO MEAS - SI(MOD-SP2): 16.1 ML/M2
BH CV ECHO MEAS - SI(MOD-SP4): 17.1 ML/M2
BH CV ECHO MEAS - SV(LVOT): 61.9 ML
BH CV ECHO MEAS - SV(MOD-SP2): 34 ML
BH CV ECHO MEAS - SV(MOD-SP4): 36 ML
BH CV ECHO MEAS - SV(RVOT): 94 ML
BH CV ECHO MEAS - TAPSE (>1.6): 2.5 CM
BH CV ECHO MEASUREMENTS AVERAGE E/E' RATIO: 8.3
BH CV XLRA - RV BASE: 3 CM
BH CV XLRA - RV LENGTH: 7.4 CM
BH CV XLRA - RV MID: 2.16 CM
BH CV XLRA - TDI S': 15.1 CM/SEC
LEFT ATRIUM VOLUME INDEX: 25.7 ML/M2
MAXIMAL PREDICTED HEART RATE: 164 BPM
SINUS: 2.8 CM
STJ: 2.8 CM
STRESS TARGET HR: 139 BPM

## 2023-02-21 PROCEDURE — 93306 TTE W/DOPPLER COMPLETE: CPT

## 2023-02-21 PROCEDURE — 93306 TTE W/DOPPLER COMPLETE: CPT | Performed by: INTERNAL MEDICINE

## 2023-03-02 ENCOUNTER — HOSPITAL ENCOUNTER (OUTPATIENT)
Dept: CARDIOLOGY | Facility: HOSPITAL | Age: 58
Discharge: HOME | End: 2023-03-02
Admitting: INTERNAL MEDICINE
Payer: COMMERCIAL

## 2023-03-02 VITALS — SYSTOLIC BLOOD PRESSURE: 128 MMHG | DIASTOLIC BLOOD PRESSURE: 70 MMHG | HEART RATE: 83 BPM

## 2023-03-02 PROCEDURE — 93018 CV STRESS TEST I&R ONLY: CPT | Performed by: INTERNAL MEDICINE

## 2023-03-02 PROCEDURE — 93017 CV STRESS TEST TRACING ONLY: CPT

## 2023-03-06 LAB
BH CV STRESS BP STAGE 1: NORMAL
BH CV STRESS BP STAGE 2: NORMAL
BH CV STRESS DURATION MIN STAGE 1: 3
BH CV STRESS DURATION MIN STAGE 2: 3
BH CV STRESS DURATION MIN STAGE 3: 2
BH CV STRESS DURATION SEC STAGE 1: 0
BH CV STRESS DURATION SEC STAGE 2: 0
BH CV STRESS DURATION SEC STAGE 3: 0
BH CV STRESS GRADE STAGE 1: 10
BH CV STRESS GRADE STAGE 2: 12
BH CV STRESS GRADE STAGE 3: 14
BH CV STRESS HR STAGE 1: 102
BH CV STRESS HR STAGE 2: 129
BH CV STRESS HR STAGE 3: 150
BH CV STRESS METS STAGE 1: 4.6
BH CV STRESS METS STAGE 2: 7.1
BH CV STRESS METS STAGE 3: 9.1
BH CV STRESS PROTOCOL 1: NORMAL
BH CV STRESS RECOVERY BP: NORMAL MMHG
BH CV STRESS RECOVERY HR: 100 BPM
BH CV STRESS SPEED STAGE 1: 1.7
BH CV STRESS SPEED STAGE 2: 2.5
BH CV STRESS SPEED STAGE 3: 3.4
BH CV STRESS STAGE 1: 1
BH CV STRESS STAGE 2: 2
BH CV STRESS STAGE 3: 3
MAXIMAL PREDICTED HEART RATE: 164 BPM
PERCENT MAX PREDICTED HR: 92.07 %
STRESS BASELINE BP: NORMAL MMHG
STRESS BASELINE HR: 83 BPM
STRESS PERCENT HR: 108 %
STRESS POST ESTIMATED WORKLOAD: 9.1 METS
STRESS POST EXERCISE DUR MIN: 8 MIN
STRESS POST EXERCISE DUR SEC: 0 SEC
STRESS POST PEAK BP: NORMAL MMHG
STRESS POST PEAK HR: 151 BPM
STRESS TARGET HR: 139 BPM

## 2023-03-23 ENCOUNTER — OFFICE VISIT (OUTPATIENT)
Dept: CARDIOLOGY | Facility: CLINIC | Age: 58
End: 2023-03-23
Payer: COMMERCIAL

## 2023-03-23 VITALS
HEIGHT: 70 IN | DIASTOLIC BLOOD PRESSURE: 89 MMHG | WEIGHT: 205 LBS | SYSTOLIC BLOOD PRESSURE: 145 MMHG | HEART RATE: 83 BPM | BODY MASS INDEX: 29.35 KG/M2

## 2023-03-23 DIAGNOSIS — E78.00 PURE HYPERCHOLESTEROLEMIA: Primary | ICD-10-CM

## 2023-03-23 DIAGNOSIS — I10 PRIMARY HYPERTENSION: ICD-10-CM

## 2023-03-23 DIAGNOSIS — R94.31 ABNORMAL EKG: ICD-10-CM

## 2023-03-23 PROCEDURE — 99214 OFFICE O/P EST MOD 30 MIN: CPT | Performed by: INTERNAL MEDICINE

## 2023-03-23 RX ORDER — ROSUVASTATIN CALCIUM 5 MG/1
5 TABLET, COATED ORAL DAILY
Qty: 90 TABLET | Refills: 11 | Status: SHIPPED | OUTPATIENT
Start: 2023-03-23

## 2023-03-23 RX ORDER — BISOPROLOL FUMARATE AND HYDROCHLOROTHIAZIDE 5; 6.25 MG/1; MG/1
1 TABLET ORAL DAILY
Qty: 30 TABLET | Refills: 11 | Status: SHIPPED | OUTPATIENT
Start: 2023-03-23

## 2023-03-23 NOTE — PROGRESS NOTES
Stress test and echo results    Subjective:        Sanjay Bishop is a 57 y.o. male who here for follow up    CC  Follow-up abnormal EKG hypertension hypercholesterolemia  HPI  57 years old male with abnormal EKG, hypertension and hypercholesterolemia here for the follow-up for the stress test and echo report     Problems Addressed this Visit        Cardiac and Vasculature    Abnormal EKG    Relevant Orders    Lipid Panel    Comprehensive Metabolic Panel    Primary hypertension    Relevant Medications    bisoprolol-hydrochlorothiazide (Ziac) 5-6.25 MG per tablet    Other Relevant Orders    Lipid Panel    Comprehensive Metabolic Panel    Pure hypercholesterolemia - Primary    Relevant Medications    rosuvastatin (CRESTOR) 5 MG tablet    Other Relevant Orders    Lipid Panel    Comprehensive Metabolic Panel   Diagnoses       Codes Comments    Pure hypercholesterolemia    -  Primary ICD-10-CM: E78.00  ICD-9-CM: 272.0     Primary hypertension     ICD-10-CM: I10  ICD-9-CM: 401.9     Abnormal EKG     ICD-10-CM: R94.31  ICD-9-CM: 794.31         .    The following portions of the patient's history were reviewed and updated as appropriate: allergies, current medications, past family history, past medical history, past social history, past surgical history and problem list.    Past Medical History:   Diagnosis Date   • Abnormal ECG February 2022    Abnormality noted by Dr Eduar Jaramillo on EKG performed February 2, 2023   • Anal fissure    • Colon polyp 2015    During colonoscopy   • Diabetes mellitus (HCC) February 2023    High A1C blood level in labs collected Feb 2023, borderline diabetes   • Eczema    • Fatty liver    • Fissure, anal 2012    Diagnosed with anal fissure   • Has never received COVID-19 vaccine    • Hyperlipidemia June 2023    Most recent blood labs collected March 15, 2023   • Hypertension June 2022    Prescribed Amlodipine Besylate 5mg by Dr Humza Melendez   • Rectal bleeding 2021   • Sleep apnea  "November 2022    Prescribed cpap therapy by Dr Larry King   • Tinnitus      reports that he has never smoked. He has quit using smokeless tobacco.  His smokeless tobacco use included snuff. He reports that he does not currently use alcohol. He reports that he does not use drugs.   Family History   Problem Relation Age of Onset   • COPD Mother         Mother had COPD   • Heart disease Father         Father had heart disease   • Malig Hyperthermia Neg Hx      •  No ECG evidence of myocardial ischemia. Negative clinical evidence of myocardial ischemia. Findings consistent with a normal ECG stress test.  Interpretation Summary       •  Left ventricular ejection fraction appears to be 61 - 65%.  •  Left ventricular diastolic function was normal    Review of Systems  Constitutional: No wt loss, fever, fatigue  Gastrointestinal: No nausea, abdominal pain  Behavioral/Psych: No insomnia or anxiety   Cardiovascular no chest pains or tightness in the chest  Objective:       Physical Exam           Physical Exam  /89   Pulse 83   Ht 177.8 cm (70\")   Wt 93 kg (205 lb)   BMI 29.41 kg/m²     General appearance: No acute changes   Eyes: Sclerae conjunctivae normal, pupils reactive   HENT: Atraumatic; oropharynx clear with moist mucous membranes and no mucosal ulcerations;  Neck: Trachea midline; NECK, supple, no thyromegaly or lymphadenopathy   Lungs: Normal size and shape, normal breath sounds, equal distribution of air, no rales and rhonchi   CV: S1-S2 regular, no murmurs, no rub, no gallop   Abdomen: Soft, nontender; no masses , no abnormal abdominal sounds   Extremities: No deformity , normal color , no peripheral edema   Skin: Normal temperature, turgor and texture; no rash, ulcers  Psych: Appropriate affect, alert and oriented to person, place and time           Procedures      Echocardiogram:        Current Outpatient Medications:   •  Sildenafil Citrate (VIAGRA PO), Take 40 mg by mouth Daily As Needed., Disp: " , Rfl:   •  bisoprolol-hydrochlorothiazide (Ziac) 5-6.25 MG per tablet, Take 1 tablet by mouth Daily., Disp: 30 tablet, Rfl: 11  •  rosuvastatin (CRESTOR) 5 MG tablet, Take 1 tablet by mouth Daily., Disp: 90 tablet, Rfl: 11   Assessment:        Patient Active Problem List   Diagnosis   • Screen for colon cancer   • Change in bowel function   • History of colon polyps   • Rectal bleed   • Abnormal EKG   • Nicotine dependence, uncomplicated   • Primary hypertension   • FH ischemic heart disease   • Borderline diabetes   • Pure hypercholesterolemia               Plan:            ICD-10-CM ICD-9-CM   1. Pure hypercholesterolemia  E78.00 272.0   2. Primary hypertension  I10 401.9   3. Abnormal EKG  R94.31 794.31     1. Pure hypercholesterolemia  Start Crestor  - Lipid Panel; Future  - Comprehensive Metabolic Panel; Future    2. Primary hypertension  We will change the medication  - Lipid Panel; Future  - Comprehensive Metabolic Panel; Future    3. Abnormal EKG  No chest  - Lipid Panel; Future  - Comprehensive Metabolic Panel; Future  crestor  See in 6 wks with flp/cmp  Dc norvasc   Start ziac 5/6.25  Pros and cons of this new medication / change medication has been explained to  the patient    Possible side effects has been explained    Associated need of the blood  Work has been explained    Need for the compliance of the medication has been explained      COUNSELING:    Sanjay Powell was given to patient for the following topics: diagnostic results, risk factor reductions, impressions, risks and benefits of treatment options and importance of treatment compliance .       SMOKING COUNSELING:        Dictated using Dragon dictation

## 2023-04-08 NOTE — OP NOTE
PREOPERATIVE DIAGNOSIS:  • High risk screening secondary to personal history of adenomatous polyps    POSTOPERATIVE DIAGNOSIS AND FINDINGS:  • Small transverse colon polyp  • Diverticulosis    PROCEDURE:  Colonoscopy to cecum with cold biopsy removal of polyp    SURGEON:  Marquise Cervantes MD    ANESTHESIA:  MAC    SPECIMEN(S):  • Polyp    DESCRIPTION:  In decubitus position digital rectal exam was normal. Colonoscope inserted under direct visualization of lumen to cecum confirmed by visualization of ileocecal valve and appendiceal orifice.  Scope was slowly withdrawn circumferentially examining all mucosal surfaces.  Bowel preparation was good.  The only mucosal abnormality was a small transverse colon polyp removed completely with a cold biopsy forceps.  Good hemostasis at the site.  Tolerated well.    RECOMMENDATION FOR FUTURE SURVEILLANCE:  To be determined based on polyp pathology and issued as separate report    Marquise Cervantes M.D.      
4 = No assist / stand by assistance

## 2023-05-11 ENCOUNTER — OFFICE VISIT (OUTPATIENT)
Dept: CARDIOLOGY | Facility: CLINIC | Age: 58
End: 2023-05-11
Payer: COMMERCIAL

## 2023-05-11 VITALS
SYSTOLIC BLOOD PRESSURE: 117 MMHG | WEIGHT: 203 LBS | DIASTOLIC BLOOD PRESSURE: 77 MMHG | HEIGHT: 70 IN | BODY MASS INDEX: 29.06 KG/M2 | HEART RATE: 66 BPM

## 2023-05-11 DIAGNOSIS — R94.31 ABNORMAL EKG: ICD-10-CM

## 2023-05-11 DIAGNOSIS — E78.00 PURE HYPERCHOLESTEROLEMIA: ICD-10-CM

## 2023-05-11 DIAGNOSIS — I10 PRIMARY HYPERTENSION: Primary | ICD-10-CM

## 2023-05-11 PROCEDURE — 99213 OFFICE O/P EST LOW 20 MIN: CPT | Performed by: INTERNAL MEDICINE

## 2023-05-11 NOTE — PROGRESS NOTES
Subjective:        Sanjay Bishop is a 57 y.o. male who here for follow up    CC  hyperlipidemia  HPI  59 yr old with abnormal ekg, htn , hyperlipidemia here for follow up with  No angina pectoris     Problems Addressed this Visit        Cardiac and Vasculature    Abnormal EKG    Relevant Orders    Lipid Panel    Comprehensive Metabolic Panel    Primary hypertension - Primary    Relevant Orders    Lipid Panel    Comprehensive Metabolic Panel    Pure hypercholesterolemia    Relevant Orders    Lipid Panel    Comprehensive Metabolic Panel   Diagnoses       Codes Comments    Primary hypertension    -  Primary ICD-10-CM: I10  ICD-9-CM: 401.9     Pure hypercholesterolemia     ICD-10-CM: E78.00  ICD-9-CM: 272.0     Abnormal EKG     ICD-10-CM: R94.31  ICD-9-CM: 794.31         .    The following portions of the patient's history were reviewed and updated as appropriate: allergies, current medications, past family history, past medical history, past social history, past surgical history and problem list.    Past Medical History:   Diagnosis Date   • Abnormal ECG February 2022    Abnormality noted by Dr Eduar Jaramillo on EKG performed February 2, 2023   • Anal fissure    • Colon polyp 2015    During colonoscopy   • Diabetes mellitus February 2023    High A1C blood level in labs collected Feb 2023, borderline diabetes   • Eczema    • Fatty liver    • Fissure, anal 2012    Diagnosed with anal fissure   • Has never received COVID-19 vaccine    • Hyperlipidemia June 2023    Most recent blood labs collected March 15, 2023   • Hypertension June 2022    Prescribed Amlodipine Besylate 5mg by Dr Humza Melendez   • Rectal bleeding 2021   • Sleep apnea November 2022    Prescribed cpap therapy by Dr Larry King   • Tinnitus      reports that he has never smoked. He has quit using smokeless tobacco.  His smokeless tobacco use included snuff. He reports that he does not currently use alcohol. He reports that he does not use drugs.  "  Family History   Problem Relation Age of Onset   • COPD Mother         Mother had COPD   • Heart disease Father         Father had heart disease   • Malig Hyperthermia Neg Hx        Review of Systems  Constitutional: No wt loss, fever, fatigue  Gastrointestinal: No nausea, abdominal pain  Behavioral/Psych: No insomnia or anxiety   Cardiovascular no cp  Objective:       Physical Exam  /77   Pulse 66   Ht 177.8 cm (70\")   Wt 92.1 kg (203 lb)   BMI 29.13 kg/m²   General appearance: No acute changes   Neck: Trachea midline; NECK, supple, no thyromegaly or lymphadenopathy   Lungs: Normal size and shape, normal breath sounds, equal distribution of air, no rales and rhonchi   CV: S1-S2 regular, no murmurs, no rub, no gallop   Abdomen: Soft, nontender; no masses , no abnormal abdominal sounds   Extremities: No deformity , normal color , no peripheral edema   Skin: Normal temperature, turgor and texture; no rash, ulcers          Procedures      Echocardiogram:        Current Outpatient Medications:   •  bisoprolol-hydrochlorothiazide (Ziac) 5-6.25 MG per tablet, Take 1 tablet by mouth Daily., Disp: 30 tablet, Rfl: 11  •  rosuvastatin (CRESTOR) 5 MG tablet, Take 1 tablet by mouth Daily., Disp: 90 tablet, Rfl: 11  •  Sildenafil Citrate (VIAGRA PO), Take 40 mg by mouth Daily As Needed., Disp: , Rfl:    Assessment:        Patient Active Problem List   Diagnosis   • Screen for colon cancer   • Change in bowel function   • History of colon polyps   • Rectal bleed   • Abnormal EKG   • Nicotine dependence, uncomplicated   • Primary hypertension   • FH ischemic heart disease   • Borderline diabetes   • Pure hypercholesterolemia               Plan:            ICD-10-CM ICD-9-CM   1. Primary hypertension  I10 401.9   2. Pure hypercholesterolemia  E78.00 272.0   3. Abnormal EKG  R94.31 794.31     1. Primary hypertension  Con same  - Lipid Panel  - Comprehensive Metabolic Panel    2. Pure hypercholesterolemia  Con same  - " Lipid Panel  - Comprehensive Metabolic Panel    3. Abnormal EKG  No angina  - Lipid Panel  - Comprehensive Metabolic Panel       6 months with labs  COUNSELING:    Sanjay Powell was given to patient for the following topics: diagnostic results, risk factor reductions, impressions, risks and benefits of treatment options and importance of treatment compliance .       SMOKING COUNSELING:        Dictated using Dragon dictation

## 2023-06-13 ENCOUNTER — OFFICE VISIT (OUTPATIENT)
Dept: NEUROLOGY | Facility: CLINIC | Age: 58
End: 2023-06-13
Payer: COMMERCIAL

## 2023-06-13 VITALS
SYSTOLIC BLOOD PRESSURE: 118 MMHG | OXYGEN SATURATION: 95 % | HEART RATE: 71 BPM | HEIGHT: 70 IN | WEIGHT: 206 LBS | DIASTOLIC BLOOD PRESSURE: 76 MMHG | BODY MASS INDEX: 29.49 KG/M2

## 2023-06-13 DIAGNOSIS — R25.3 BENIGN FASCICULATIONS: Primary | ICD-10-CM

## 2023-06-13 PROBLEM — E78.5 HYPERLIPIDEMIA: Status: ACTIVE | Noted: 2022-12-21

## 2023-06-13 PROBLEM — R73.9 HYPERGLYCEMIA: Status: ACTIVE | Noted: 2022-08-03

## 2023-06-13 PROBLEM — R74.8 ELEVATED LIVER ENZYMES: Status: ACTIVE | Noted: 2022-08-03

## 2023-06-13 PROBLEM — K57.30 DIVERTICULAR DISEASE OF COLON: Status: ACTIVE | Noted: 2022-06-16

## 2023-06-13 PROBLEM — E66.9 OBESE: Status: ACTIVE | Noted: 2022-06-16

## 2023-06-13 PROBLEM — I10 ESSENTIAL HYPERTENSION: Status: ACTIVE | Noted: 2022-06-16

## 2023-06-13 PROBLEM — E29.1 MALE HYPOGONADISM: Status: ACTIVE | Noted: 2022-06-16

## 2023-06-13 PROBLEM — L30.9 HAND ECZEMA: Status: ACTIVE | Noted: 2022-12-21

## 2023-06-13 PROBLEM — K76.0 STEATOSIS OF LIVER: Status: ACTIVE | Noted: 2022-06-16

## 2023-06-13 PROBLEM — R40.0 DAYTIME SOMNOLENCE: Status: ACTIVE | Noted: 2022-06-16

## 2023-06-13 PROCEDURE — 99205 OFFICE O/P NEW HI 60 MIN: CPT | Performed by: PSYCHIATRY & NEUROLOGY

## 2023-06-13 RX ORDER — UBIDECARENONE 100 MG
100 CAPSULE ORAL DAILY
COMMUNITY

## 2023-06-13 NOTE — LETTER
June 13, 2023       No Recipients    Patient: Sanjay Bishop   YOB: 1965   Date of Visit: 6/13/2023       Dear Dr. Miller Recipients:    Thank you for referring Sanjay Bishop to me for evaluation. Below are the relevant portions of my assessment and plan of care.    If you have questions, please do not hesitate to call me. I look forward to following Sanjay along with you.         Sincerely,        Barry Higuera MD        CC:   No Recipients    Barry Higuera MD  06/13/23 5706  Signed  CC: Fasciculations    HPI:  Sanjay Bishop is a  57 y.o.  right-handed white male who was sent for neurological consultation by Dr. Jaramillo regarding diffuse fasciculations.  The patient states that they began in January 2023 and they started in the calves and thighs buttocks abdomen and arms.  There were very prominent crying most of the time.  The patient has been a longtime tobacco chewer and he switched to tobacco free nicotine pouches.  He was using 6 mg patches present most of the time.  He had MRI of the brain done which was basically normal other than some minimal small vessel change.  He was seen by cardiology that was felt unrelated.  He reduced the dosage to 3 mg patches and he noted that the fasciculations dropped in frequency quite a bit but he still has them on a daily basis.  The patient specifically denies numbness or tingling and he does not feel weak.  No weight loss.  No speech or swallowing difficulties.    On a side note he notices that upon falling asleep he may jerk and this has increased more lately.  No restless leg syndrome or periodic limb movements.  He indicates concern for neurological diseases such as ALS.        Past Medical History:   Diagnosis Date   • Abnormal ECG February 2022    Abnormality noted by Dr Eduar Jaramillo on EKG performed February 2, 2023   • Anal fissure    • Colon polyp 2015    During colonoscopy   • Diabetes mellitus February 2023    High A1C blood level in labs  collected Feb 2023, borderline diabetes   • Eczema    • Environmental allergies    • Fatty liver    • Fissure, anal 2012    Diagnosed with anal fissure   • Has never received COVID-19 vaccine    • Hyperlipidemia June 2023    Most recent blood labs collected March 15, 2023   • Hypertension June 2022    Prescribed Amlodipine Besylate 5mg by Dr Humza Melendez   • Rectal bleeding 2021   • Sleep apnea November 2022    Prescribed cpap therapy by Dr Larry King   • Tinnitus          Past Surgical History:   Procedure Laterality Date   • COLONOSCOPY N/A 02/28/2019    Procedure: COLONOSCOPY to cecum with hot snare polyectomies and biopsies;  Surgeon: Marquise Cervantes MD;  Location:  [facility];  Service: General   • COLONOSCOPY N/A 06/08/2022    Procedure: COLONOSCOPY TO CECUM WITH COLD BX POLYPECTOMY;  Surgeon: Marquise Cervantes MD;  Location:  [facility];  Service: General;  Laterality: N/A;  RECTAL BLDG, ABD PAIN  --DIVERTICULOSIS, POLYP    • UMBILICAL HERNIA REPAIR      JULY 2017           Current Outpatient Medications:   •  bisoprolol-hydrochlorothiazide (Ziac) 5-6.25 MG per tablet, Take 1 tablet by mouth Daily., Disp: 30 tablet, Rfl: 11  •  coenzyme Q10 100 MG capsule, Take 1 capsule by mouth Daily., Disp: , Rfl:   •  MELATONIN PO, Take 10 mg by mouth. 3 -10 mg as needed, Disp: , Rfl:   •  rosuvastatin (CRESTOR) 5 MG tablet, Take 1 tablet by mouth Daily., Disp: 90 tablet, Rfl: 11  •  Sildenafil Citrate (VIAGRA PO), Take 40 mg by mouth Daily As Needed., Disp: , Rfl:   •  Unable to find, 2 each 1 (One) Time. Prosvent, Disp: , Rfl:       Family History   Problem Relation Age of Onset   • COPD Mother         Mother had COPD   • Heart disease Father         Father had heart disease   • Malig Hyperthermia Neg Hx          Social History     Socioeconomic History   • Marital status:    Tobacco Use   • Smoking status: Never   • Smokeless tobacco: Former     Types: Snuff   • Tobacco comments:     Formerly  "used smokeless tobacco.  Currently use nicotine pouches orally   Vaping Use   • Vaping Use: Never used   Substance and Sexual Activity   • Alcohol use: Not Currently     Comment: RARE   • Drug use: Never   • Sexual activity: Yes     Partners: Female         Allergies   Allergen Reactions   • Other Rash     BAND-AID CAUSE A RASH   • Penicillins Rash         Pain Scale: 0/10        ROS:  Review of Systems   HENT:  Positive for tinnitus.    Respiratory:  Positive for apnea.    Genitourinary:  Positive for difficulty urinating and urgency.   Skin:  Negative for color change, rash and wound.   Allergic/Immunologic: Positive for environmental allergies. Negative for food allergies and immunocompromised state.   Neurological:  Positive for dizziness. Negative for tremors, seizures, syncope, facial asymmetry, speech difficulty, weakness, light-headedness, numbness and headaches.   Hematological:  Negative for adenopathy. Does not bruise/bleed easily.   Psychiatric/Behavioral:  Negative for confusion, decreased concentration and sleep disturbance. The patient is not nervous/anxious.        I have reviewed and agree with the above ROS completed by the medical assistant.      Physical Exam:  Vitals:    06/13/23 1421   Height: 177.8 cm (70\")     Orthostatic BP:    Body mass index is 29.13 kg/m².    Physical Exam  General: Overweight white male no acute distress  HEENT: Normocephalic no evidence of trauma.  Discs flat.  No AV nicking.  Throat negative  Neck: Supple.  No thyromegaly.  No cervical bruits  Heart: Regular rate and rhythm no murmurs.  No pedal edema  Extremities: Radial pulses strong and simultaneous      Neurological Exam:   Mental Status: Awake, alert, oriented to person, place and time.  Conversant without evidence of an affective disorder, thought disorder, delusions or hallucinations.  Attention span and concentration are normal.  HCF: No aphasia, apraxia or dysarthria.  Recent and remote memory intact.  " Knowledge  of recent events intact.  CN: I:   II: Visual fields full without left inattention   III, IV, VI: Eye movements intact without nystagmus or ptosis.  Pupils equal    round and reactive to light.   V,VII: Light touch and pinprick intact all 3 divisions of V.  Facial muscles symmetrical.   VIII: Hearing intact to finger rub   IX,X: Soft palate elevates symmetrically   XI: Sternomastoid and trapezius are strong.   XII: Tongue midline without atrophy or fasciculations  Motor: Normal tone and bulk in the upper and lower extremities   Power testing:  Full power in all muscles tested arms and legs              Reflexes: Upper extremities: +1 diffusely        Lower extremities: +1 diffusely        Toe signs: Downgoing bilaterally  Sensory: Light touch: Diffusely intact arms and legs        Pinprick: Diffusely intact arms and legs        Vibration: Intact at the ankles        Position: Intact at the great toe    Cerebellar: Finger-to-nose: Was normal           Rapid movement: Normal           Heel-to-shin: Normal  Gait and Station: Normal casual toe heel and tandem walk.  No Romberg no drift    Results:      Lab Results   Component Value Date    GLUCOSE 137 (H) 02/02/2023    BUN 15 02/02/2023    CREATININE 0.91 02/02/2023    BCR 16.5 02/02/2023    CO2 27.5 02/02/2023    CALCIUM 9.1 02/02/2023    ALBUMIN 4.9 02/02/2023    AST 24 02/02/2023    ALT 42 (H) 02/02/2023       Lab Results   Component Value Date    WBC 6.25 02/02/2023    HGB 16.6 02/02/2023    HCT 47.1 02/02/2023    MCV 86.3 02/02/2023     02/02/2023         .No results found for: RPR      No results found for: TSH, C1UCCVQ, L6YIXPL, THYROIDAB      No results found for: ZJAQDDXR45      No results found for: FOLATE      No results found for: HGBA1C      Lab Results   Component Value Date    GLUCOSE 137 (H) 02/02/2023    BUN 15 02/02/2023    CREATININE 0.91 02/02/2023    BCR 16.5 02/02/2023    K 3.9 02/02/2023    CO2 27.5 02/02/2023    CALCIUM 9.1  02/02/2023    ALBUMIN 4.9 02/02/2023    AST 24 02/02/2023    ALT 42 (H) 02/02/2023         Lab Results   Component Value Date    WBC 6.25 02/02/2023    HGB 16.6 02/02/2023    HCT 47.1 02/02/2023    MCV 86.3 02/02/2023     02/02/2023             Assessment:   1.  Fasciculations which has some correlation with use of nicotine pouches and reduced some with reduction of dosage from 6 mg to 3 mg per pouch          Plan:  1.  Strong suspicion of benign fasciculations.  He has no hallmark findings of a peripheral neuropathic process or motor neuron disease.  He plans on continuing to attempt to wean himself off of nicotine and see if the fasciculations persist.  If so then he will let me know for follow-up/EMG.  Other than that for now I would not do anything different.  2.  Return as needed          Time: 60 minutes              Dictated utilizing Dragon dictation.

## 2023-06-13 NOTE — PROGRESS NOTES
CC: Fasciculations    HPI:  Sanjay Bishop is a  57 y.o.  right-handed white male who was sent for neurological consultation by Dr. Jaramillo regarding diffuse fasciculations.  The patient states that they began in January 2023 and they started in the calves and thighs buttocks abdomen and arms.  There were very prominent crying most of the time.  The patient has been a longtime tobacco chewer and he switched to tobacco free nicotine pouches.  He was using 6 mg patches present most of the time.  He had MRI of the brain done which was basically normal other than some minimal small vessel change.  He was seen by cardiology that was felt unrelated.  He reduced the dosage to 3 mg patches and he noted that the fasciculations dropped in frequency quite a bit but he still has them on a daily basis.  The patient specifically denies numbness or tingling and he does not feel weak.  No weight loss.  No speech or swallowing difficulties.    On a side note he notices that upon falling asleep he may jerk and this has increased more lately.  No restless leg syndrome or periodic limb movements.  He indicates concern for neurological diseases such as ALS.        Past Medical History:   Diagnosis Date   • Abnormal ECG February 2022    Abnormality noted by Dr Eduar Jaramillo on EKG performed February 2, 2023   • Anal fissure    • Colon polyp 2015    During colonoscopy   • Diabetes mellitus February 2023    High A1C blood level in labs collected Feb 2023, borderline diabetes   • Eczema    • Environmental allergies    • Fatty liver    • Fissure, anal 2012    Diagnosed with anal fissure   • Has never received COVID-19 vaccine    • Hyperlipidemia June 2023    Most recent blood labs collected March 15, 2023   • Hypertension June 2022    Prescribed Amlodipine Besylate 5mg by Dr Humza Melendez   • Rectal bleeding 2021   • Sleep apnea November 2022    Prescribed cpap therapy by Dr Larry King   • Tinnitus          Past Surgical History:    Procedure Laterality Date   • COLONOSCOPY N/A 02/28/2019    Procedure: COLONOSCOPY to cecum with hot snare polyectomies and biopsies;  Surgeon: Marquise Cervantes MD;  Location:  [facility];  Service: General   • COLONOSCOPY N/A 06/08/2022    Procedure: COLONOSCOPY TO CECUM WITH COLD BX POLYPECTOMY;  Surgeon: Marquise Cervantes MD;  Location:  [facility];  Service: General;  Laterality: N/A;  RECTAL BLDG, ABD PAIN  --DIVERTICULOSIS, POLYP    • UMBILICAL HERNIA REPAIR      JULY 2017           Current Outpatient Medications:   •  bisoprolol-hydrochlorothiazide (Ziac) 5-6.25 MG per tablet, Take 1 tablet by mouth Daily., Disp: 30 tablet, Rfl: 11  •  coenzyme Q10 100 MG capsule, Take 1 capsule by mouth Daily., Disp: , Rfl:   •  MELATONIN PO, Take 10 mg by mouth. 3 -10 mg as needed, Disp: , Rfl:   •  rosuvastatin (CRESTOR) 5 MG tablet, Take 1 tablet by mouth Daily., Disp: 90 tablet, Rfl: 11  •  Sildenafil Citrate (VIAGRA PO), Take 40 mg by mouth Daily As Needed., Disp: , Rfl:   •  Unable to find, 2 each 1 (One) Time. Prosvent, Disp: , Rfl:       Family History   Problem Relation Age of Onset   • COPD Mother         Mother had COPD   • Heart disease Father         Father had heart disease   • Malig Hyperthermia Neg Hx          Social History     Socioeconomic History   • Marital status:    Tobacco Use   • Smoking status: Never   • Smokeless tobacco: Former     Types: Snuff   • Tobacco comments:     Formerly used smokeless tobacco.  Currently use nicotine pouches orally   Vaping Use   • Vaping Use: Never used   Substance and Sexual Activity   • Alcohol use: Not Currently     Comment: RARE   • Drug use: Never   • Sexual activity: Yes     Partners: Female         Allergies   Allergen Reactions   • Other Rash     BAND-AID CAUSE A RASH   • Penicillins Rash         Pain Scale: 0/10        ROS:  Review of Systems   HENT:  Positive for tinnitus.    Respiratory:  Positive for apnea.    Genitourinary:  Positive for  "difficulty urinating and urgency.   Skin:  Negative for color change, rash and wound.   Allergic/Immunologic: Positive for environmental allergies. Negative for food allergies and immunocompromised state.   Neurological:  Positive for dizziness. Negative for tremors, seizures, syncope, facial asymmetry, speech difficulty, weakness, light-headedness, numbness and headaches.   Hematological:  Negative for adenopathy. Does not bruise/bleed easily.   Psychiatric/Behavioral:  Negative for confusion, decreased concentration and sleep disturbance. The patient is not nervous/anxious.        I have reviewed and agree with the above ROS completed by the medical assistant.      Physical Exam:  Vitals:    06/13/23 1421   Height: 177.8 cm (70\")     Orthostatic BP:    Body mass index is 29.13 kg/m².    Physical Exam  General: Overweight white male no acute distress  HEENT: Normocephalic no evidence of trauma.  Discs flat.  No AV nicking.  Throat negative  Neck: Supple.  No thyromegaly.  No cervical bruits  Heart: Regular rate and rhythm no murmurs.  No pedal edema  Extremities: Radial pulses strong and simultaneous      Neurological Exam:   Mental Status: Awake, alert, oriented to person, place and time.  Conversant without evidence of an affective disorder, thought disorder, delusions or hallucinations.  Attention span and concentration are normal.  HCF: No aphasia, apraxia or dysarthria.  Recent and remote memory intact.  Knowledge  of recent events intact.  CN: I:   II: Visual fields full without left inattention   III, IV, VI: Eye movements intact without nystagmus or ptosis.  Pupils equal    round and reactive to light.   V,VII: Light touch and pinprick intact all 3 divisions of V.  Facial muscles symmetrical.   VIII: Hearing intact to finger rub   IX,X: Soft palate elevates symmetrically   XI: Sternomastoid and trapezius are strong.   XII: Tongue midline without atrophy or fasciculations  Motor: Normal tone and bulk in the " upper and lower extremities   Power testing:  Full power in all muscles tested arms and legs              Reflexes: Upper extremities: +1 diffusely        Lower extremities: +1 diffusely        Toe signs: Downgoing bilaterally  Sensory: Light touch: Diffusely intact arms and legs        Pinprick: Diffusely intact arms and legs        Vibration: Intact at the ankles        Position: Intact at the great toe    Cerebellar: Finger-to-nose: Was normal           Rapid movement: Normal           Heel-to-shin: Normal  Gait and Station: Normal casual toe heel and tandem walk.  No Romberg no drift    Results:      Lab Results   Component Value Date    GLUCOSE 137 (H) 02/02/2023    BUN 15 02/02/2023    CREATININE 0.91 02/02/2023    BCR 16.5 02/02/2023    CO2 27.5 02/02/2023    CALCIUM 9.1 02/02/2023    ALBUMIN 4.9 02/02/2023    AST 24 02/02/2023    ALT 42 (H) 02/02/2023       Lab Results   Component Value Date    WBC 6.25 02/02/2023    HGB 16.6 02/02/2023    HCT 47.1 02/02/2023    MCV 86.3 02/02/2023     02/02/2023         .No results found for: RPR      No results found for: TSH, M2WDZTH, O4BGUFV, THYROIDAB      No results found for: [id number]      No results found for: FOLATE      No results found for: HGBA1C      Lab Results   Component Value Date    GLUCOSE 137 (H) 02/02/2023    BUN 15 02/02/2023    CREATININE 0.91 02/02/2023    BCR 16.5 02/02/2023    K 3.9 02/02/2023    CO2 27.5 02/02/2023    CALCIUM 9.1 02/02/2023    ALBUMIN 4.9 02/02/2023    AST 24 02/02/2023    ALT 42 (H) 02/02/2023         Lab Results   Component Value Date    WBC 6.25 02/02/2023    HGB 16.6 02/02/2023    HCT 47.1 02/02/2023    MCV 86.3 02/02/2023     02/02/2023             Assessment:   1.  Fasciculations which has some correlation with use of nicotine pouches and reduced some with reduction of dosage from 6 mg to 3 mg per pouch          Plan:  1.  Strong suspicion of benign fasciculations.  He has no hallmark findings of a peripheral  neuropathic process or motor neuron disease.  He plans on continuing to attempt to wean himself off of nicotine and see if the fasciculations persist.  If so then he will let me know for follow-up/EMG.  Other than that for now I would not do anything different.  2.  Return as needed          Time: 60 minutes              Dictated utilizing Dragon dictation.

## 2023-11-16 ENCOUNTER — OFFICE VISIT (OUTPATIENT)
Dept: CARDIOLOGY | Facility: CLINIC | Age: 58
End: 2023-11-16
Payer: COMMERCIAL

## 2023-11-16 VITALS
DIASTOLIC BLOOD PRESSURE: 87 MMHG | SYSTOLIC BLOOD PRESSURE: 136 MMHG | HEART RATE: 68 BPM | BODY MASS INDEX: 30.35 KG/M2 | WEIGHT: 212 LBS | HEIGHT: 70 IN

## 2023-11-16 DIAGNOSIS — E78.00 PURE HYPERCHOLESTEROLEMIA: ICD-10-CM

## 2023-11-16 DIAGNOSIS — I10 PRIMARY HYPERTENSION: Primary | ICD-10-CM

## 2023-11-16 DIAGNOSIS — R94.31 ABNORMAL EKG: ICD-10-CM

## 2023-11-16 PROCEDURE — 93000 ELECTROCARDIOGRAM COMPLETE: CPT | Performed by: INTERNAL MEDICINE

## 2023-11-16 PROCEDURE — 99213 OFFICE O/P EST LOW 20 MIN: CPT | Performed by: INTERNAL MEDICINE

## 2023-11-16 NOTE — PROGRESS NOTES
1YR    Subjective:        Sanjay Bishop is a 57 y.o. male who here for follow up    CC  Follow-up abnormal EKG hypertension hypercholesterolemia  HPI  57 years old male with abnormal EKG hypertension hypercholesterolemia here for the follow-up with no complaints of chest pains tightness heaviness for the pressure sensation     Problems Addressed this Visit          Cardiac and Vasculature    Abnormal EKG    Primary hypertension - Primary    Pure hypercholesterolemia     Diagnoses         Codes Comments    Primary hypertension    -  Primary ICD-10-CM: I10  ICD-9-CM: 401.9     Pure hypercholesterolemia     ICD-10-CM: E78.00  ICD-9-CM: 272.0     Abnormal EKG     ICD-10-CM: R94.31  ICD-9-CM: 794.31           .    The following portions of the patient's history were reviewed and updated as appropriate: allergies, current medications, past family history, past medical history, past social history, past surgical history and problem list.    Past Medical History:   Diagnosis Date    Abnormal ECG February 2022    Abnormality noted by Dr Eduar Jaramillo on EKG performed February 2, 2023    Anal fissure     Colon polyp 2015    During colonoscopy    Diabetes mellitus February 2023    High A1C blood level in labs collected Feb 2023, borderline diabetes    Eczema     Environmental allergies     Fatty liver     Fissure, anal 2012    Diagnosed with anal fissure    Has never received COVID-19 vaccine     Hyperlipidemia June 2023    Most recent blood labs collected March 15, 2023    Hypertension June 2022    Prescribed Amlodipine Besylate 5mg by Dr Humza Melendez    Rectal bleeding 2021    Sleep apnea November 2022    Prescribed cpap therapy by Dr Larry King    Tinnitus      reports that he has never smoked. He has quit using smokeless tobacco.  His smokeless tobacco use included snuff. He reports that he does not currently use alcohol. He reports that he does not use drugs.   Family History   Problem Relation Age of Onset     "COPD Mother         Mother had COPD    Heart disease Father         Father had heart disease    Malig Hyperthermia Neg Hx        Review of Systems  Constitutional: No wt loss, fever, fatigue  Gastrointestinal: No nausea, abdominal pain  Behavioral/Psych: No insomnia or anxiety   Cardiovascular no chest pains or tightness in the chest  Objective:       Physical Exam           Physical Exam  /87   Pulse 68   Ht 177.8 cm (70\")   Wt 96.2 kg (212 lb)   BMI 30.42 kg/m²     General appearance: No acute changes   Eyes: Sclerae conjunctivae normal, pupils reactive   HENT: Atraumatic; oropharynx clear with moist mucous membranes and no mucosal ulcerations;  Neck: Trachea midline; NECK, supple, no thyromegaly or lymphadenopathy   Lungs: Normal size and shape, normal breath sounds, equal distribution of air, no rales and rhonchi   CV: S1-S2 regular, no murmurs, no rub, no gallop   Abdomen: Soft, nontender; no masses , no abnormal abdominal sounds   Extremities: No deformity , normal color , no peripheral edema   Skin: Normal temperature, turgor and texture; no rash, ulcers  Psych: Appropriate affect, alert and oriented to person, place and time             ECG 12 Lead    Date/Time: 11/16/2023 9:34 AM  Performed by: Laci Parr MD    Authorized by: Laci Parr MD  Comparison: compared with previous ECG   Similar to previous ECG  Rhythm: sinus rhythm  ST Flattening: all    Clinical impression: non-specific ECG            Echocardiogram:        Current Outpatient Medications:     bisoprolol-hydrochlorothiazide (Ziac) 5-6.25 MG per tablet, Take 1 tablet by mouth Daily., Disp: 30 tablet, Rfl: 11    coenzyme Q10 100 MG capsule, Take 1 capsule by mouth Daily., Disp: , Rfl:     MELATONIN PO, Take 10 mg by mouth. 3 -10 mg as needed, Disp: , Rfl:     rosuvastatin (CRESTOR) 5 MG tablet, Take 1 tablet by mouth Daily., Disp: 90 tablet, Rfl: 11    Sildenafil Citrate (VIAGRA PO), Take 40 mg by mouth Daily As " Needed., Disp: , Rfl:     Unable to find, 2 each 1 (One) Time. Prosvent, Disp: , Rfl:    Assessment:        [unfilled]            Plan:            ICD-10-CM ICD-9-CM   1. Primary hypertension  I10 401.9   2. Pure hypercholesterolemia  E78.00 272.0   3. Abnormal EKG  R94.31 794.31     1. Primary hypertension  Continue current treatment    2. Pure hypercholesterolemia  Continue current treatment    3. Abnormal EKG  No angina pectoris      1 yr  COUNSELING:    Sanjay Powell was given to patient for the following topics: diagnostic results, risk factor reductions, impressions, risks and benefits of treatment options and importance of treatment compliance .       SMOKING COUNSELING:        Dictated using Dragon dictation

## 2024-02-29 RX ORDER — BISOPROLOL FUMARATE AND HYDROCHLOROTHIAZIDE 5; 6.25 MG/1; MG/1
1 TABLET ORAL DAILY
Qty: 90 TABLET | Refills: 3 | Status: SHIPPED | OUTPATIENT
Start: 2024-02-29

## 2024-05-28 RX ORDER — ROSUVASTATIN CALCIUM 5 MG/1
5 TABLET, COATED ORAL DAILY
Qty: 90 TABLET | Refills: 1 | Status: SHIPPED | OUTPATIENT
Start: 2024-05-28

## 2024-11-14 ENCOUNTER — OFFICE VISIT (OUTPATIENT)
Dept: CARDIOLOGY | Facility: CLINIC | Age: 59
End: 2024-11-14
Payer: COMMERCIAL

## 2024-11-14 VITALS
OXYGEN SATURATION: 97 % | BODY MASS INDEX: 27.84 KG/M2 | DIASTOLIC BLOOD PRESSURE: 84 MMHG | WEIGHT: 194 LBS | SYSTOLIC BLOOD PRESSURE: 125 MMHG | HEART RATE: 68 BPM

## 2024-11-14 DIAGNOSIS — I10 PRIMARY HYPERTENSION: ICD-10-CM

## 2024-11-14 DIAGNOSIS — E78.00 PURE HYPERCHOLESTEROLEMIA: ICD-10-CM

## 2024-11-14 DIAGNOSIS — R94.31 ABNORMAL EKG: Primary | ICD-10-CM

## 2024-11-14 PROCEDURE — 93000 ELECTROCARDIOGRAM COMPLETE: CPT | Performed by: INTERNAL MEDICINE

## 2024-11-14 PROCEDURE — 99213 OFFICE O/P EST LOW 20 MIN: CPT | Performed by: INTERNAL MEDICINE

## 2024-11-14 RX ORDER — METFORMIN HYDROCHLORIDE 500 MG/1
500 TABLET, EXTENDED RELEASE ORAL 2 TIMES DAILY
COMMUNITY
Start: 2024-11-06

## 2024-11-14 NOTE — PROGRESS NOTES
Subjective:        Sanjay Bishop is a 58 y.o. male who here for follow up    CC  Follow-up abnormal EKG hypertension hypercholesterolemia  HPI  58-year-old male with abnormal EKG hypertension hypercholesterolemia here for the follow-up denies any chest pains or tightness in the chest     Problems Addressed this Visit          Cardiac and Vasculature    Abnormal EKG - Primary    Relevant Orders    Treadmill Stress Test    Primary hypertension    Pure hypercholesterolemia     Diagnoses         Codes Comments    Abnormal EKG    -  Primary ICD-10-CM: R94.31  ICD-9-CM: 794.31     Pure hypercholesterolemia     ICD-10-CM: E78.00  ICD-9-CM: 272.0     Primary hypertension     ICD-10-CM: I10  ICD-9-CM: 401.9           .    The following portions of the patient's history were reviewed and updated as appropriate: allergies, current medications, past family history, past medical history, past social history, past surgical history and problem list.    Past Medical History:   Diagnosis Date    Abnormal ECG February 2022    Abnormality noted by Dr Eduar Jaramillo on EKG performed February 2, 2023    Anal fissure     Colon polyp 2015    During colonoscopy    Diabetes mellitus February 2023    High A1C blood level in labs collected Feb 2023, borderline diabetes    Eczema     Environmental allergies     Fatty liver     Fissure, anal 2012    Diagnosed with anal fissure    Has never received COVID-19 vaccine     Hyperlipidemia June 2023    Most recent blood labs collected March 15, 2023    Hypertension June 2022    Prescribed Amlodipine Besylate 5mg by Dr Humza Melendez    Rectal bleeding 2021    Sleep apnea November 2022    Prescribed cpap therapy by Dr Larry King    Tinnitus      reports that he has never smoked. He has quit using smokeless tobacco.  His smokeless tobacco use included snuff. He reports that he does not currently use alcohol. He reports that he does not use drugs.   Family History   Problem Relation Age of  Onset    COPD Mother         Mother had COPD    Heart disease Father         Father had heart disease    Malig Hyperthermia Neg Hx        Review of Systems  Constitutional: No wt loss, fever, fatigue  Gastrointestinal: No nausea, abdominal pain  Behavioral/Psych: No insomnia or anxiety   Cardiovascular no chest pains or tightness in the chest  Objective:       Physical Exam  /84 (BP Location: Left arm, Patient Position: Sitting, Cuff Size: Adult)   Pulse 68   Wt 88 kg (194 lb)   SpO2 97%   BMI 27.84 kg/m²   General appearance: No acute changes   Neck: Trachea midline; NECK, supple, no thyromegaly or lymphadenopathy   Lungs: Normal size and shape, normal breath sounds, equal distribution of air, no rales and rhonchi   CV: S1-S2 regular, no murmurs, no rub, no gallop   Abdomen: Soft, nontender; no masses , no abnormal abdominal sounds   Extremities: No deformity , normal color , no peripheral edema   Skin: Normal temperature, turgor and texture; no rash, ulcers            ECG 12 Lead    Date/Time: 11/14/2024 10:40 AM  Performed by: Laci Parr MD    Authorized by: Laci Parr MD  Comparison: compared with previous ECG   Similar to previous ECG  Rhythm: sinus rhythm    Clinical impression: non-specific ECG            Echocardiogram:    Results for orders placed in visit on 02/09/23    Adult Transthoracic Echo Complete W/ Cont if Necessary Per Protocol    Interpretation Summary    Left ventricular ejection fraction appears to be 61 - 65%.    Left ventricular diastolic function was normal.          Current Outpatient Medications:     bisoprolol-hydrochlorothiazide (ZIAC) 5-6.25 MG per tablet, TAKE 1 TABLET BY MOUTH EVERY DAY, Disp: 90 tablet, Rfl: 3    metFORMIN ER (GLUCOPHAGE-XR) 500 MG 24 hr tablet, Take 1 tablet by mouth 2 (Two) Times a Day., Disp: , Rfl:     rosuvastatin (CRESTOR) 5 MG tablet, TAKE 1 TABLET BY MOUTH EVERY DAY, Disp: 90 tablet, Rfl: 1    Sildenafil Citrate (VIAGRA PO),  Take 40 mg by mouth Daily As Needed., Disp: , Rfl:     Unable to find, 2 each 1 (One) Time. Prosvent, Disp: , Rfl:     coenzyme Q10 100 MG capsule, Take 1 capsule by mouth Daily., Disp: , Rfl:     MELATONIN PO, Take 10 mg by mouth. 3 -10 mg as needed, Disp: , Rfl:    Assessment:                Plan:          ICD-10-CM ICD-9-CM   1. Abnormal EKG  R94.31 794.31   2. Pure hypercholesterolemia  E78.00 272.0   3. Primary hypertension  I10 401.9     1. Abnormal EKG  Considering the patient's symptoms as well as clinical situation and  EKG findings, along with cardiac risk factors, ischemic workup is necessary to rule out ischemic cardiomyopathy, stress induced arrhythmias, and functional capacity for diagnosis as well as prognostic consideration    - Treadmill Stress Test; Future    2. Pure hypercholesterolemia  Continue current treatment    3. Primary hypertension  Continue current treatment      1 yr ett  COUNSELING:    Sanjay Powell was given to patient for the following topics: diagnostic results, risk factor reductions, impressions, risks and benefits of treatment options and importance of treatment compliance .       SMOKING COUNSELING:        Dictated using Dragon dictation

## 2025-03-10 RX ORDER — BISOPROLOL FUMARATE AND HYDROCHLOROTHIAZIDE 5; 6.25 MG/1; MG/1
1 TABLET ORAL DAILY
Qty: 90 TABLET | Refills: 3 | Status: SHIPPED | OUTPATIENT
Start: 2025-03-10

## (undated) DEVICE — SNAR POLYP SENSATION STDOVL 27 240 BX40

## (undated) DEVICE — KT ORCA ORCAPOD DISP STRL

## (undated) DEVICE — ADAPT CLN BIOGUARD AIR/H2O DISP

## (undated) DEVICE — Device: Brand: DEFENDO AIR/WATER/SUCTION AND BIOPSY VALVE

## (undated) DEVICE — TUBING, SUCTION, 1/4" X 10', STRAIGHT: Brand: MEDLINE

## (undated) DEVICE — THE SINGLE USE ETRAP – POLYP TRAP IS USED FOR SUCTION RETRIEVAL OF ENDOSCOPICALLY REMOVED POLYPS.: Brand: ETRAP

## (undated) DEVICE — SINGLE-USE BIOPSY FORCEPS: Brand: RADIAL JAW 4

## (undated) DEVICE — MSK PROC CURAPLEX O2 2/ADAPT 7FT

## (undated) DEVICE — CANN NASL CO2 TRULINK W/O2 A/

## (undated) DEVICE — SENSR O2 OXIMAX FNGR A/ 18IN NONSTR

## (undated) DEVICE — LN SMPL CO2 SHTRM SD STREAM W/M LUER

## (undated) DEVICE — THE TORRENT IRRIGATION SCOPE CONNECTOR IS USED WITH THE TORRENT IRRIGATION TUBING TO PROVIDE IRRIGATION FLUIDS SUCH AS STERILE WATER DURING GASTROINTESTINAL ENDOSCOPIC PROCEDURES WHEN USED IN CONJUNCTION WITH AN IRRIGATION PUMP (OR ELECTROSURGICAL UNIT).: Brand: TORRENT